# Patient Record
Sex: FEMALE | Race: WHITE | NOT HISPANIC OR LATINO | Employment: FULL TIME | URBAN - METROPOLITAN AREA
[De-identification: names, ages, dates, MRNs, and addresses within clinical notes are randomized per-mention and may not be internally consistent; named-entity substitution may affect disease eponyms.]

---

## 2018-07-13 ENCOUNTER — OFFICE VISIT (OUTPATIENT)
Dept: URGENT CARE | Facility: URGENT CARE | Age: 33
End: 2018-07-13
Payer: COMMERCIAL

## 2018-07-13 VITALS
SYSTOLIC BLOOD PRESSURE: 110 MMHG | DIASTOLIC BLOOD PRESSURE: 80 MMHG | RESPIRATION RATE: 16 BRPM | WEIGHT: 238.4 LBS | HEART RATE: 69 BPM | BODY MASS INDEX: 34.7 KG/M2 | TEMPERATURE: 98.2 F

## 2018-07-13 DIAGNOSIS — H11.32 SUBCONJUNCTIVAL HEMATOMA, LEFT: Primary | ICD-10-CM

## 2018-07-13 PROCEDURE — 99213 OFFICE O/P EST LOW 20 MIN: CPT | Performed by: FAMILY MEDICINE

## 2018-07-13 RX ORDER — CIPROFLOXACIN HYDROCHLORIDE 3.5 MG/ML
1 SOLUTION/ DROPS TOPICAL 3 TIMES DAILY
Qty: 1.1 ML | Refills: 0 | Status: SHIPPED | OUTPATIENT
Start: 2018-07-13 | End: 2018-07-20

## 2018-07-13 NOTE — PROGRESS NOTES
SUBJECTIVE:  Nasrin Vasquez is a 33 year old female  complains of sub-conjunctival hematoma, this happened last night when she awoke she was having some slight discomfort in her eye.    No fever no chills no visual problems no trauma to the eye    OBJECTIVE:  /80  Pulse 69  Temp 98.2  F (36.8  C) (Oral)  Resp 16  Wt 238 lb 6.4 oz (108.1 kg)  BMI 34.7 kg/m2  Exam; examination shows left lateral sub-conjunctival hematoma the iris appears normal no anterior chamber problems.    Remainder of exam is normal    ASSESSMENT:  1.  Some conjunctival hematoma; slight irritation so we will use an antibiotic drops on her gave her reassurance that this is not a significant problem not associated with blood pressure    PLAN:  1.  Ciloxan ophthalmic drops 1 3 times a day for 7 days return to your primary care at that time if still with irritation or significant redness in the eye

## 2018-07-13 NOTE — MR AVS SNAPSHOT
"              After Visit Summary   2018    Nasrin Vasquez    MRN: [id number]           Patient Information     Date Of Birth          1985        Visit Information        Provider Department      2018 9:20 AM Tenzin Daniel MD [facility]        Today's Diagnoses     Subconjunctival hematoma, left    -  1       Follow-ups after your visit        Who to contact     If you have questions or need follow up information about today's clinic visit or your schedule please contact [facility] directly at [phone].  Normal or non-critical lab and imaging results will be communicated to you by MyChart, letter or phone within 4 business days after the clinic has received the results. If you do not hear from us within 7 days, please contact the clinic through [employer] or phone. If you have a critical or abnormal lab result, we will notify you by phone as soon as possible.  Submit refill requests through [employer] or call your pharmacy and they will forward the refill request to us. Please allow 3 business days for your refill to be completed.          Additional Information About Your Visit        MyChart Information     [employer] lets you send messages to your doctor, view your test results, renew your prescriptions, schedule appointments and more. To sign up, go to [email or web address] . Click on \"Log in\" on the left side of the screen, which will take you to the Welcome page. Then click on \"Sign up Now\" on the right side of the page.     You will be asked to enter the access code listed below, as well as some personal information. Please follow the directions to create your username and password.     Your access code is: [email or web address]  Expires: 10/11/2018  9:46 AM     Your access code will  in 90 days. If you need help or a new code, please call your [place] clinic or [phone].        Care EveryWhere ID     This is your Care EveryWhere " ID. This could be used by other organizations to access your [facility] medical records  [id number]        Your Vitals Were     Pulse Temperature Respirations BMI (Body Mass Index)          69 98.2  F (36.8  C) (Oral) 16 34.7 kg/m2         Blood Pressure from Last 3 Encounters:   07/13/18 110/80   08/11/14 100/50   05/14/14 102/60    Weight from Last 3 Encounters:   07/13/18 238 lb 6.4 oz (108.1 kg)   08/11/14 192 lb 1.6 oz (87.1 kg)   05/14/14 204 lb (92.5 kg)              Today, you had the following     No orders found for display         Today's Medication Changes          These changes are accurate as of 7/13/18  9:46 AM.  If you have any questions, ask your nurse or doctor.               Start taking these medicines.        Dose/Directions    ciprofloxacin 0.3 % ophthalmic solution   Commonly known as:  CILOXAN   Used for:  Subconjunctival hematoma, left   Started by:  Tenzin Daniel MD        Dose:  1 drop   Place 1 drop Into the left eye 3 times daily for 7 days   Quantity:  1.1 mL   Refills:  0            Where to get your medicines      These medications were sent to [facility]/pharmacy #[id number] [place] [id number] [address]     Phone:  [phone]     ciprofloxacin 0.3 % ophthalmic solution                Primary Care Provider Fax #    Physician No Ref-Primary [phone]       No address on file        Equal Access to Services     JAMA GUTIERREZ AH: Hadii shade reido Sochelo, waaxda luqadaha, qaybta kaalmada leandra, danny hernandez. So [facility] [phone].    ATENCIÓN: Si habla español, tiene a mckeon disposición servicios gratuitos de asistencia lingüística. Llame al [phone].    We comply with applicable federal civil rights laws and [place] laws. We do not discriminate on the basis of race, color, national origin, age, disability, sex, sexual orientation, or gender identity.            Thank you!     Thank you for choosing  [facility]  for your care. Our goal is always to provide you with excellent care. Hearing back from our patients is one way we can continue to improve our services. Please take a few minutes to complete the written survey that you may receive in the mail after your visit with us. Thank you!             Your Updated Medication List - Protect others around you: Learn how to safely use, store and throw away your medicines at www.disposemymeds.org.          This list is accurate as of 7/13/18  9:46 AM.  Always use your most recent med list.                   Brand Name Dispense Instructions for use Diagnosis    amoxicillin-clavulanate 875-125 MG per tablet    AUGMENTIN    20 tablet    Take 1 tablet by mouth 2 times daily    Avulsed tooth, initial encounter, Acute pharyngitis, Ear pain, bilateral       ciprofloxacin 0.3 % ophthalmic solution    CILOXAN    1.1 mL    Place 1 drop Into the left eye 3 times daily for 7 days    Subconjunctival hematoma, left       ibuprofen 200 MG tablet    ADVIL/MOTRIN     Take 3 tablets by mouth as needed        mupirocin 2 % ointment    BACTROBAN    22 g    Apply to affected area bid-tid for 7 days    Angular cheilitis

## 2020-01-20 ENCOUNTER — OFFICE VISIT (OUTPATIENT)
Dept: URGENT CARE | Facility: URGENT CARE | Age: 35
End: 2020-01-20
Payer: COMMERCIAL

## 2020-01-20 VITALS
DIASTOLIC BLOOD PRESSURE: 61 MMHG | HEART RATE: 91 BPM | OXYGEN SATURATION: 98 % | TEMPERATURE: 97.2 F | SYSTOLIC BLOOD PRESSURE: 127 MMHG

## 2020-01-20 DIAGNOSIS — J18.9 PNEUMONIA OF RIGHT LOWER LOBE DUE TO INFECTIOUS ORGANISM: Primary | ICD-10-CM

## 2020-01-20 PROCEDURE — 99214 OFFICE O/P EST MOD 30 MIN: CPT | Performed by: FAMILY MEDICINE

## 2020-01-20 RX ORDER — BENZONATATE 100 MG/1
100 CAPSULE ORAL 3 TIMES DAILY PRN
Qty: 21 CAPSULE | Refills: 0 | Status: SHIPPED | OUTPATIENT
Start: 2020-01-20 | End: 2020-01-27

## 2020-01-20 RX ORDER — AZITHROMYCIN 250 MG/1
TABLET, FILM COATED ORAL
Qty: 6 TABLET | Refills: 0 | Status: SHIPPED | OUTPATIENT
Start: 2020-01-20 | End: 2020-01-25

## 2020-01-21 NOTE — PROGRESS NOTES
SUBJECTIVE: Nasrin Vasquez is a 34 year old female presenting with a chief complaint of fever, nasal congestion, cough  and hurts when breathing.  Onset of symptoms was 4 day(s) ago.  Course of illness is worsening.    Severity moderate  Current and Associated symptoms: stuffy nose and cough - non-productive  Treatment measures tried include Tylenol/Ibuprofen.  Predisposing factors include None.    No past medical history on file.  Allergies   Allergen Reactions     No Known Drug Allergy      Social History     Tobacco Use     Smoking status: Never Smoker     Smokeless tobacco: Never Used   Substance Use Topics     Alcohol use: No       ROS:  SKIN: no rash  GI: no vomiting    OBJECTIVE:  /61 (BP Location: Left arm, Cuff Size: Adult Regular)   Pulse 91   Temp 97.2  F (36.2  C) (Oral)   SpO2 98% GENERAL APPEARANCE: healthy, alert and no distress  EYES: EOMI,  PERRL, conjunctiva clear  HENT: ear canals and TM's normal.  Nose and mouth without ulcers, erythema or lesions  NECK: supple, nontender, no lymphadenopathy  RESP: rhonchi R lower posterior  CV: regular rates and rhythm, normal S1 S2, no murmur noted  SKIN: no suspicious lesions or rashes      ICD-10-CM    1. Pneumonia of right lower lobe due to infectious organism (H) J18.1 azithromycin (ZITHROMAX) 250 MG tablet     benzonatate (TESSALON) 100 MG capsule       Fluids/Rest, f/u if worse/not any better

## 2020-02-22 ENCOUNTER — OFFICE VISIT (OUTPATIENT)
Dept: URGENT CARE | Facility: URGENT CARE | Age: 35
End: 2020-02-22
Payer: COMMERCIAL

## 2020-02-22 ENCOUNTER — ANCILLARY PROCEDURE (OUTPATIENT)
Dept: GENERAL RADIOLOGY | Facility: CLINIC | Age: 35
End: 2020-02-22
Attending: FAMILY MEDICINE
Payer: COMMERCIAL

## 2020-02-22 VITALS
RESPIRATION RATE: 16 BRPM | HEART RATE: 52 BPM | WEIGHT: 233 LBS | TEMPERATURE: 97.9 F | SYSTOLIC BLOOD PRESSURE: 104 MMHG | DIASTOLIC BLOOD PRESSURE: 70 MMHG | BODY MASS INDEX: 33.91 KG/M2

## 2020-02-22 DIAGNOSIS — S99.912A ANKLE INJURY, LEFT, INITIAL ENCOUNTER: Primary | ICD-10-CM

## 2020-02-22 PROCEDURE — 73610 X-RAY EXAM OF ANKLE: CPT | Mod: LT

## 2020-02-22 PROCEDURE — 99214 OFFICE O/P EST MOD 30 MIN: CPT | Performed by: FAMILY MEDICINE

## 2020-02-22 NOTE — LETTER
[facility]  [address] [address]  [phone]      February 24, 2020    RE:  Nasrin Vasquez                                                                                                                                                       [address]   [facility] [address]            To whom it may concern:    Nasrin Vasquez is under my professional care for Medical.   Please excuse pt from work last Friaday and Saturday.  She may return to work after recheck with Podiatry.          Sincerely,        Jose Cruz Paul, DO    [facility]

## 2020-02-22 NOTE — PROGRESS NOTES
SUBJECTIVE:  Chief Complaint   Patient presents with     Ankle Pain     lt ankle pain,rolled ankle few days ago   .ident presents with a chief complaint of left ankle.  The injury occurred days ago.   The injury happened while while walking.   How: trauma:  immediate pain  The patient complained of moderate pain and has had decreased ROM.    Pain exacerbated by movement    He treated it initially with no therapy.   This is the first time this type of injury has occurred to this patient.     No past medical history on file.  Allergies   Allergen Reactions     No Known Drug Allergy      Social History     Tobacco Use     Smoking status: Never Smoker     Smokeless tobacco: Never Used   Substance Use Topics     Alcohol use: No       ROSINTEGUMENTARY/SKIN: NEGATIVE for open wound/bleeding and NEGATIVE for bruising  MUSCULOSKELETAL: NEGATIVE for joint swelling, paresthesias, radicular pain    EXAM: /70 (Cuff Size: Adult Large)   Pulse 52   Temp 97.9  F (36.6  C) (Oral)   Resp 16   Wt 105.7 kg (233 lb)   BMI 33.91 kg/m     Gen: healthy,alert,no distress  Extremity: ankle has pain with palpation and rom.   There is not compromise to the distal circulation.  Pulses are +2 and CRT is brisk.  EXTREMITIES: peripheral pulses normal  SKIN: no suspicious lesions or rashes  NEURO: Normal strength and tone, sensory exam grossly normal, mentation intact and speech normal    Xray without acute findings, no fx read by Jose Cruz Paul D.O.      ICD-10-CM    1. Ankle injury, left, initial encounter S99.912A XR Ankle Left G/E 3 Views     Nursing Communication 1     PODIATRY/FOOT & ANKLE SURGERY REFERRAL     SIL

## 2020-02-26 ENCOUNTER — OFFICE VISIT (OUTPATIENT)
Dept: PODIATRY | Facility: CLINIC | Age: 35
End: 2020-02-26
Payer: COMMERCIAL

## 2020-02-26 ENCOUNTER — TELEPHONE (OUTPATIENT)
Dept: PODIATRY | Facility: CLINIC | Age: 35
End: 2020-02-26

## 2020-02-26 VITALS
WEIGHT: 233 LBS | HEIGHT: 70 IN | BODY MASS INDEX: 33.36 KG/M2 | DIASTOLIC BLOOD PRESSURE: 70 MMHG | SYSTOLIC BLOOD PRESSURE: 116 MMHG

## 2020-02-26 DIAGNOSIS — M25.572 ACUTE LEFT ANKLE PAIN: Primary | ICD-10-CM

## 2020-02-26 DIAGNOSIS — S93.402A SPRAIN OF LEFT ANKLE, UNSPECIFIED LIGAMENT, INITIAL ENCOUNTER: ICD-10-CM

## 2020-02-26 PROCEDURE — 99203 OFFICE O/P NEW LOW 30 MIN: CPT | Performed by: PODIATRIST

## 2020-02-26 ASSESSMENT — MIFFLIN-ST. JEOR: SCORE: 1834.19

## 2020-02-26 NOTE — TELEPHONE ENCOUNTER
Reason for Call:  Form, our goal is to have forms completed with 7 days, however, some forms may require a visit or additional information.    Type of letter, form or note:  Attending Physician Statement    Where did the form come from: Patient or family brought in       Desired completion date of form: 02/27/20     How will form be returned?:  emailed to [email or web address]    Form was started and given to Dr Banks to complete.

## 2020-02-26 NOTE — PATIENT INSTRUCTIONS
Thank you for choosing [facility] Podiatry / Foot & Ankle Surgery!    DR. AARON'S CLINIC LOCATIONS     MONDAY - [place] WEDNESDAY (AM ONLY) - [place]   [address] [place] [address] [address] [address]   [phone] / [phone] / [phone]       THURSDAY - [place] SCHEDULE SURGERY: [phone]   [address] [facility] [address] APPOINTMENTS: [phone]   [place] [address] BILLING QUESTIONS: [phone] / [phone] RADIOLOGY: [phone]     Follow up in 1 month    [place] ORTHOTICS LOCATIONS  [facility]  [address]  [place] [address]  Phone: [phone]  Fax: [phone] [facility]  [address]  [place] [address]  Phone: [phone]   Fax: [phone]   [facility]  [address]  [place] [address]  Phone: [phone]  Fax: [phone] [facility]  [address]  [place] [address]  Phone: [phone]   Fax: [phone]   [facility]   [address]  [place] [address]  Phone: [phone]  Fax: [phone] * Please call any location listed to make an appointment for a casting/fitting. Your referral was sent to their central office and they will all have the order on file.

## 2020-02-27 NOTE — PROGRESS NOTES
ASSESSMENT/PLAN:    Encounter Diagnosis   Name Primary?     Acute left ankle pain Yes     I reviewed the x-ray images from urgent care with her.  The relevant anatomy of the ankle joint was discussed.    Her mechanism of injury was described as an inversion ankle injury of the left ankle.  However, clinical exam is not fully consistent with this type of injury.  She had more generalized pain with palpation around the ankle including the Achilles tendon.    A Tri-Lock brace was provided to help offload the ankle.    Because her work requires prolonged weightbearing, custom orthoses were recommended and prescribed.    I expect her pain to resolve.  I have asked her to return in 3 to 4 weeks.  If she continues to have discomfort I will consider advanced imaging and/or physical therapy.      Body mass index is 33.91 kg/m .    Weight management plan: Patient was referred to their PCP to discuss a diet and exercise plan.      Rajat Banks DPM, FACFAS, MS    [facility]/Foot & Ankle Surgery      ____________________________________________________________________    HPI:         Chief Complaint: Follow-up from urgent care for a left ankle injury and pain.  Onset of problem: 1 week ago she inverted her left ankle and is concerned that she is still having pain.  Pain/ discomfort is described as: Throbbing in foot but with rest.  Occasional pain in the lower calf.  Deep ache when walking.  Ratin out of 10 at worst  Frequency:  daily    The pain is made worse with prolonged walking  Previous treatment: ibuprofen, elevation, ice.  Urgent care 20. XR completed.     She describes left ankle pain medially, laterally and posterior.*  Patient Active Problem List   Diagnosis     CARDIOVASCULAR SCREENING; LDL GOAL LESS THAN 160   *  *No past surgical history on file.*  *  Current Outpatient Medications   Medication Sig Dispense Refill     ibuprofen (ADVIL,MOTRIN) 200 MG tablet Take 3 tablets by mouth as  needed         ROS:     A 10-point review of systems was performed and is positive for that noted above in the HPI and as seen below.  All other areas are negative.     Numbness in feet?  no   Calf pain with walking? yes  Recent foot/ankle injury? yno  Weight change over past 12 months? no  Self perception as overweight? yes  Recent flu-like symptoms? no  Joint pain other than feet ? knees    Social History: Employment:  [employer] at [employer] - on feet 8-12 hours per day at work;  Exercise/Physical activity:  no;  Tobacco use:  no  Social History     Socioeconomic History     Marital status: Single     Spouse name: Not on file     Number of children: Not on file     Years of education: Not on file     Highest education level: Not on file   Occupational History     Not on file   Social Needs     Financial resource strain: Not on file     Food insecurity:     Worry: Not on file     Inability: Not on file     Transportation needs:     Medical: Not on file     Non-medical: Not on file   Tobacco Use     Smoking status: Never Smoker     Smokeless tobacco: Never Used   Substance and Sexual Activity     Alcohol use: No     Drug use: No     Sexual activity: Never   Lifestyle     Physical activity:     Days per week: Not on file     Minutes per session: Not on file     Stress: Not on file   Relationships     Social connections:     Talks on phone: Not on file     Gets together: Not on file     Attends Gnosticist service: Not on file     Active member of club or organization: Not on file     Attends meetings of clubs or organizations: Not on file     Relationship status: Not on file     Intimate partner violence:     Fear of current or ex partner: Not on file     Emotionally abused: Not on file     Physically abused: Not on file     Forced sexual activity: Not on file   Other Topics Concern     Not on file   Social History Narrative     Not on file       Family history:  Family History   Problem Relation Age of Onset     Asthma Brother   "    Cancer Maternal Grandmother      Cancer Maternal Grandfather        Rheumatoid arthritis:  no  Foot Problems: no  Diabetes: no      EXAM:    Vitals: /70   Ht 1.765 m (5' 9.5\")   Wt 105.7 kg (233 lb)   BMI 33.91 kg/m    BMI: Body mass index is 33.91 kg/m .  Height: 5' 9.5\"    Constitutional/ general:  Pt is in no apparent distress, appears well-nourished.  Cooperative with history and physical exam.     Vascular:  Pedal pulses are palpable bilaterally for both the DP and PT arteries.  CFT < 3 sec.  No edema.  Pedal hair growth noted.     Neuro:  Alert and oriented x 3. Coordinated gait.  Light touch sensation is intact to the L4, L5, S1 distributions. No obvious deficits.  No evidence of neurological-based weakness, spasticity, or contracture in the lower extremities.     Derm: Normal texture and turgor.  No erythema, ecchymosis, or cyanosis.  No open lesions.     Musculoskeletal:    Lower extremity muscle strength is normal.  Patient is ambulatory without an assistive device or brace .  No gross deformities - rectus left  foot with weight bearing.  Smooth right ankle ROM. Pain with inversion and eversion of her left foot against resistance. Pain on palpation to the left Achilles tendon.     Radiographic Exam:  X-Ray Findings:  I personally reviewed the 2/22/20 films.      XR ANKLE LT G/E 3 VW 2/22/2020 10:19 AM      HISTORY: Ankle injury, left, initial encounter     COMPARISON: None.                                                                      IMPRESSION: No fractures are identified. The ankle mortise is intact.  The talar dome is unremarkable. Small plantar and Achilles calcaneal  spurs.      RACHELLE PHAM MD    "

## 2020-03-02 ENCOUNTER — TELEPHONE (OUTPATIENT)
Dept: PODIATRY | Facility: CLINIC | Age: 35
End: 2020-03-02

## 2020-03-02 NOTE — TELEPHONE ENCOUNTER
Called patient to ask about additional information regarding form. Patient answered and provided the answers below:    Did injury occur at work? If so, how? Yes, occurred while walking during work. Recalls rolling ankle several times and eventually becoming painful to walk.    Is patient currently working? Yes    Is patient asking for time off from work? No. States that brace has been helping alleviate foot pain. But after 6+hrs of work, feet are tired and painful.     Work restrictions possible? Yes, reducing hours or seated duty.       Patient also asked when she should follow up in the clinic and how long she should wear brace. Relayed to patient that she should follow up in 1 month and continue to wear brace up to 1 month.     Anaid Springer MA on 3/2/2020 at 5:04 PM

## 2020-03-06 NOTE — TELEPHONE ENCOUNTER
"I called Nasrin back.  She was not able to provide details regarding what \"details\" I need to provided for her employer. She said that her  told her there is a form that I need to fill out.  I asked her to please forward the form to me.  She will plan on delivering it to the [facility] on Monday and said she has the after visit summary with phone # and address.    I told her that I am happy to help with this.     Rajat Banks, HARINDER, FACFAS, MS    [facility] Podiatry/Foot & Ankle Surgery    "

## 2020-03-06 NOTE — TELEPHONE ENCOUNTER
Please also see telephone encounter dated 3/2/20.     Patient left voicemail stating she received a doctor's form for her job regarding working light duty with a brace. She works for the [employer] and they need a more specific note with restrictions. She would like a call back today if possible.     She can be reached at: [phone]  Ok to leave message : no consent on file    TBlanca Kelley RN

## 2020-03-12 NOTE — TELEPHONE ENCOUNTER
Called and LVM to call back with her date of injury and specific restriction end date for her paperwork.     Also need her to schedule a follow up in clinic in the next 2-3 weeks to write on the form.    Dr Banks will not be in clinic until Monday morning to finish the forms.    Given [facility] triage number to call back with dates.

## 2020-03-12 NOTE — TELEPHONE ENCOUNTER
[facility]   The original injury date was 2/20/20.   As far as restriction end date, she is not sure. The original form gave dates of 3/3-3/17/20. It wasn't detailed enough so not sure if is still following those dates or not.   As far as a follow up appointment, she states she will call back at another time to schedule.   No other information was left.     ROSANNA Kelley RN

## 2020-03-13 NOTE — TELEPHONE ENCOUNTER
Routed to Dr. Banks to advise.    David Irving, [facility]  Podiatry/Foot & Ankle Surgery  [facility]

## 2020-03-18 NOTE — TELEPHONE ENCOUNTER
Patient LVM that she got the forms. She asked about more specifications on what the 4 hr/ day meant. Her and her employer were unsure what that specifically meant. Would like a call to clarify.     Purnima Elizondo ATC

## 2020-03-18 NOTE — TELEPHONE ENCOUNTER
I saw her for ankle pain that she related to an injury at work.  I'm not sure where the original dates of restrictions came from, but was roughly a two week period after her clinic visit.  I can never fully understand the demands of one's job. We talked about different ways to take stress off the ankle. One was part time work for a period of time. The original form I believe said working 4 hours/ day for that two weeks. Therefore, that's what I put on the new form. However, I extended the date until early April to make sure she recovers.      I also requested she be allowed to wear the ankle brace at work.      If she is going better, perhaps no restrictions are needed other than wearing the brace as needed?  If her pain is not improving, then we had discussed physical therapy and MRI.     If more clarification is needed, and it's not the best time for her to come to clinic, we should set up a phone visit.     Dr. Banks

## 2020-03-18 NOTE — TELEPHONE ENCOUNTER
Forms completed Monday and emailed to patient this morning per her request.    Original placed in abstracting.

## 2020-03-19 NOTE — TELEPHONE ENCOUNTER
Letter sent to pts email as I have not been able to get ahold of her. Asked for her to schedule a telephone visit per Dr Banks.

## 2020-06-16 ENCOUNTER — OFFICE VISIT (OUTPATIENT)
Dept: URGENT CARE | Facility: URGENT CARE | Age: 35
End: 2020-06-16
Payer: COMMERCIAL

## 2020-06-16 VITALS
WEIGHT: 245.8 LBS | HEART RATE: 61 BPM | OXYGEN SATURATION: 100 % | BODY MASS INDEX: 35.78 KG/M2 | TEMPERATURE: 98.8 F | SYSTOLIC BLOOD PRESSURE: 132 MMHG | DIASTOLIC BLOOD PRESSURE: 76 MMHG

## 2020-06-16 DIAGNOSIS — R11.2 NAUSEA AND VOMITING, INTRACTABILITY OF VOMITING NOT SPECIFIED, UNSPECIFIED VOMITING TYPE: ICD-10-CM

## 2020-06-16 DIAGNOSIS — R19.7 DIARRHEA, UNSPECIFIED TYPE: ICD-10-CM

## 2020-06-16 DIAGNOSIS — R10.13 ABDOMINAL PAIN, EPIGASTRIC: Primary | ICD-10-CM

## 2020-06-16 LAB
ALBUMIN SERPL-MCNC: 3 G/DL (ref 3.4–5)
ALBUMIN UR-MCNC: NEGATIVE MG/DL
ALP SERPL-CCNC: 115 U/L (ref 40–150)
ALT SERPL W P-5'-P-CCNC: 19 U/L (ref 0–50)
AMYLASE SERPL-CCNC: 48 U/L (ref 30–110)
ANION GAP SERPL CALCULATED.3IONS-SCNC: <1 MMOL/L (ref 3–14)
APPEARANCE UR: CLEAR
AST SERPL W P-5'-P-CCNC: 12 U/L (ref 0–45)
BACTERIA #/AREA URNS HPF: ABNORMAL /HPF
BASOPHILS # BLD AUTO: 0 10E9/L (ref 0–0.2)
BASOPHILS NFR BLD AUTO: 0.3 %
BILIRUB SERPL-MCNC: 0.5 MG/DL (ref 0.2–1.3)
BILIRUB UR QL STRIP: NEGATIVE
BUN SERPL-MCNC: 10 MG/DL (ref 7–30)
CALCIUM SERPL-MCNC: 8.4 MG/DL (ref 8.5–10.1)
CHLORIDE SERPL-SCNC: 110 MMOL/L (ref 94–109)
CO2 SERPL-SCNC: 31 MMOL/L (ref 20–32)
COLOR UR AUTO: YELLOW
CREAT SERPL-MCNC: 0.84 MG/DL (ref 0.52–1.04)
DIFFERENTIAL METHOD BLD: NORMAL
EOSINOPHIL # BLD AUTO: 0.2 10E9/L (ref 0–0.7)
EOSINOPHIL NFR BLD AUTO: 2.4 %
ERYTHROCYTE [DISTWIDTH] IN BLOOD BY AUTOMATED COUNT: 12.3 % (ref 10–15)
GFR SERPL CREATININE-BSD FRML MDRD: >90 ML/MIN/{1.73_M2}
GLUCOSE SERPL-MCNC: 91 MG/DL (ref 70–99)
GLUCOSE UR STRIP-MCNC: NEGATIVE MG/DL
HCT VFR BLD AUTO: 43.7 % (ref 35–47)
HGB BLD-MCNC: 14.3 G/DL (ref 11.7–15.7)
HGB UR QL STRIP: ABNORMAL
KETONES UR STRIP-MCNC: NEGATIVE MG/DL
LEUKOCYTE ESTERASE UR QL STRIP: NEGATIVE
LYMPHOCYTES # BLD AUTO: 2.2 10E9/L (ref 0.8–5.3)
LYMPHOCYTES NFR BLD AUTO: 28.5 %
MCH RBC QN AUTO: 30.5 PG (ref 26.5–33)
MCHC RBC AUTO-ENTMCNC: 32.7 G/DL (ref 31.5–36.5)
MCV RBC AUTO: 93 FL (ref 78–100)
MONOCYTES # BLD AUTO: 0.4 10E9/L (ref 0–1.3)
MONOCYTES NFR BLD AUTO: 4.9 %
MUCOUS THREADS #/AREA URNS LPF: PRESENT /LPF
NEUTROPHILS # BLD AUTO: 4.8 10E9/L (ref 1.6–8.3)
NEUTROPHILS NFR BLD AUTO: 63.9 %
NITRATE UR QL: NEGATIVE
NON-SQ EPI CELLS #/AREA URNS LPF: ABNORMAL /LPF
PH UR STRIP: 5.5 PH (ref 5–7)
PLATELET # BLD AUTO: 286 10E9/L (ref 150–450)
POTASSIUM SERPL-SCNC: 4.7 MMOL/L (ref 3.4–5.3)
PROT SERPL-MCNC: 8 G/DL (ref 6.8–8.8)
RBC # BLD AUTO: 4.69 10E12/L (ref 3.8–5.2)
RBC #/AREA URNS AUTO: ABNORMAL /HPF
SODIUM SERPL-SCNC: 141 MMOL/L (ref 133–144)
SOURCE: ABNORMAL
SP GR UR STRIP: >1.03 (ref 1–1.03)
UROBILINOGEN UR STRIP-ACNC: 0.2 EU/DL (ref 0.2–1)
WBC # BLD AUTO: 7.5 10E9/L (ref 4–11)
WBC #/AREA URNS AUTO: ABNORMAL /HPF

## 2020-06-16 PROCEDURE — 85025 COMPLETE CBC W/AUTO DIFF WBC: CPT | Performed by: FAMILY MEDICINE

## 2020-06-16 PROCEDURE — 99214 OFFICE O/P EST MOD 30 MIN: CPT | Performed by: FAMILY MEDICINE

## 2020-06-16 PROCEDURE — 36415 COLL VENOUS BLD VENIPUNCTURE: CPT | Performed by: FAMILY MEDICINE

## 2020-06-16 PROCEDURE — 80053 COMPREHEN METABOLIC PANEL: CPT | Performed by: FAMILY MEDICINE

## 2020-06-16 PROCEDURE — 82150 ASSAY OF AMYLASE: CPT | Performed by: FAMILY MEDICINE

## 2020-06-16 PROCEDURE — 81001 URINALYSIS AUTO W/SCOPE: CPT | Performed by: FAMILY MEDICINE

## 2020-06-16 NOTE — PROGRESS NOTES
SUBJECTIVE  HPI: Nasrin Vasquez is a 35 year old female  who presents with the CC of abdominal/pelvic pain.   Pain is located in the generalized area, with radiation to None    The pain is characterized as cramping.    Pain has been present for day(s) and is stable.     EXACERBATING FACTORS: NEGATIVE.   RELIEVING FACTORS: NEGATIVE.    ASSOCIATED SX: nausea, vomiting and diarrhea.     No past medical history on file.  Allergies   Allergen Reactions     No Known Drug Allergy      Social History     Tobacco Use     Smoking status: Never Smoker     Smokeless tobacco: Never Used   Substance Use Topics     Alcohol use: No        Allergies   Allergen Reactions     No Known Drug Allergy         ROS:CONSTITUTIONAL:NEGATIVE for fever, chills, change in weight  INTEGUMENTARY/SKIN: NEGATIVE for worrisome rashes, moles or lesions    EXAMINATION:  /76   Pulse 61   Temp 98.8  F (37.1  C)   Wt 111.5 kg (245 lb 12.8 oz)   SpO2 100%   BMI 35.78 kg/m     GENERAL APPEARANCE: healthy, alert and no distress  ABDOMEN: tenderness mild generalized, hyperactive bowel sounds, no guarding/rigidity/rebound      ICD-10-CM    1. Abdominal pain, epigastric  R10.13 CBC with platelets differential     Comprehensive metabolic panel     Amylase     UA with Microscopic reflex to Culture   2. Diarrhea, unspecified type  R19.7 CBC with platelets differential     Comprehensive metabolic panel     Amylase     UA with Microscopic reflex to Culture   3. Nausea and vomiting, intractability of vomiting not specified, unspecified vomiting type  R11.2 CBC with platelets differential     Comprehensive metabolic panel     Amylase     UA with Microscopic reflex to Culture       F/U PCP/IM/FP, ED if worse

## 2020-09-08 ENCOUNTER — OFFICE VISIT (OUTPATIENT)
Dept: URGENT CARE | Facility: URGENT CARE | Age: 35
End: 2020-09-08
Payer: COMMERCIAL

## 2020-09-08 VITALS
TEMPERATURE: 98.1 F | DIASTOLIC BLOOD PRESSURE: 50 MMHG | HEART RATE: 52 BPM | WEIGHT: 249.4 LBS | SYSTOLIC BLOOD PRESSURE: 115 MMHG | OXYGEN SATURATION: 100 % | BODY MASS INDEX: 36.3 KG/M2

## 2020-09-08 DIAGNOSIS — H10.13 ALLERGIC CONJUNCTIVITIS, BILATERAL: ICD-10-CM

## 2020-09-08 DIAGNOSIS — H11.32 SUBCONJUNCTIVAL HEMORRHAGE OF LEFT EYE: Primary | ICD-10-CM

## 2020-09-08 PROCEDURE — 99213 OFFICE O/P EST LOW 20 MIN: CPT | Performed by: PHYSICIAN ASSISTANT

## 2020-09-08 NOTE — PATIENT INSTRUCTIONS
(H11.32) Subconjunctival hemorrhage of left eye  (primary encounter diagnosis)  Comment:   Plan: See handout on subconjunctival hemorrhages    Should you develop pain, seek further evaluation with OPHTHALMOLOGY    (H10.13) Allergic conjunctivitis, bilateral  Comment: with mild underlying seasonal allergies  Plan: ketotifen (ZADITOR) 0.025 % ophthalmic solution                Patient Education     Subconjunctival Hemorrhage    A subconjunctival hemorrhage is when a blood vessel breaks open in the white of the eye. It causes a bright red patch in the white of the eye. It is similar to a bruise on the skin. This type of hemorrhage is common. It can look quite alarming, but it is usually harmless.  Understanding the conjunctiva  The conjunctiva is the thin layer that covers the inside of the eyelids and the surface of the eye. It has many tiny blood vessels that bring oxygen and nutrients to the eye. The sclera is the white part of the eye that lies beneath the conjunctiva. Sometimes a blood vessel in the conjunctiva breaks open and bleeds. The blood then collects under the conjunctiva and turns part of the eye red. Over several weeks, your body then absorbs the blood.  What causes subconjunctival hemorrhage?  In many cases the cause isn t known. But some health conditions may make it more likely. These include:    Eye injury    Eye surgery    High blood pressure    Inflammation of the conjunctiva    Contact lens use    Diabetes    Arteriosclerosis    Tumor of the conjunctiva    Diseases that affect blood clotting    Violent sneezing, coughing, or vomiting    Certain medicines that can increase bleeding, such as aspirin    Pushing hard during childbirth    Straining during constipation  Symptoms of subconjunctival hemorrhage  The main symptom is a red patch on the eye. You may notice it after waking up in the morning. In most cases just one eye will have a hemorrhage. It usually happens once and then goes away. But some  health conditions may cause repeat hemorrhages. You may feel like you have something in your eye, but this is not common. The hemorrhage shouldn t affect your eyesight or cause any pain. If you do have pain, you may have another type of problem with your eye.  Diagnosing subconjunctival hemorrhage  Your healthcare provider will ask about your health history. You may have a physical exam. This includes a basic eye exam. Your provider will make sure you don t have other causes of red eye that may need other treatment.  You will need to see an eye doctor (ophthalmologist) if you have had an eye injury. This doctor might use a special lighted microscope to look closely at your eye. This helps show the doctor if the injury hurt the eye itself and not just its outer layer.  If this is not your first subconjunctival hemorrhage, your doctor may need to find the cause. For example, you may need blood tests to check for a blood clotting disorder.  Treatment for subconjunctival hemorrhage  In most cases you will not need treatment. The red patch will usually go away on its own in a few weeks. It will turn from red to brown and then yellow. There are no treatments to speed up this process. Your doctor may suggest you use a warm compress and artificial tears eye drops to help relieve some of the redness.  If your subconjunctival hemorrhage was caused by a health condition, that condition will be treated. For example, you may need a blood pressure medicine to treat high blood pressure.  When to call your healthcare provider  Call your healthcare provider right away if you have any of these:    Hemorrhage that doesn t go away in 2 to 3 weeks    Eye pain    Loss of eyesight    Another subconjunctival hemorrhage    Date Last Reviewed: 2/1/2017 2000-2019 [employer]. [address] [place] [address], [place] [address]. All rights reserved. This information is not intended as a substitute for professional medical care. Always  follow your healthcare professional's instructions.

## 2020-09-08 NOTE — PROGRESS NOTES
Patient presents with:  Urgent Care: pt.states she woke up this morning and her left eye was red, crusty, and painful    Has been lifting heavy items, moving  Does have allergies    SUBJECTIVE:  Chief Complaint:   Chief Complaint   Patient presents with     Urgent Care     pt.states she woke up this morning and her left eye was red, crusty, and painful     History of Present Illness:  Nasrin Vasquez is a 35 year old female who presents complaining of a red area in her left eye since this morning.  Denies any injury.  Has been doing heavy lifting as she is moving    Denies any vision changes.    Does have mild seasonal allergies and noticed some crusting of her eyes this morning.  Denies pain, but states that it is uncomfortable.     Contact wearer : No    No past medical history on file.  Current Outpatient Medications   Medication Sig Dispense Refill     ketotifen (ZADITOR) 0.025 % ophthalmic solution Place 1 drop into both eyes 2 times daily 1 Bottle 0     ibuprofen (ADVIL,MOTRIN) 200 MG tablet Take 3 tablets by mouth as needed          ROS:  CONSTITUTIONAL:NEGATIVE for fever, chills, change in weight  INTEGUMENTARY/SKIN: NEGATIVE for worrisome rashes, moles or lesions  EYES: as per HPI  ENT/MOUTH: NEGATIVE for ear, mouth and throat problems  RESP:NEGATIVE for significant cough or SOB  CV: NEGATIVE for chest pain, palpitations or peripheral edema  GI: NEGATIVE for nausea, abdominal pain, heartburn, or change in bowel habits  MUSCULOSKELETAL: NEGATIVE for significant arthralgias or myalgia  NEURO: NEGATIVE for weakness, dizziness or paresthesias  ENDOCRINE: NEGATIVE for temperature intolerance, skin/hair changes  Review of systems negative except as stated above.    OBJECTIVE:  /50   Pulse 52   Temp 98.1  F (36.7  C) (Oral)   Wt 113.1 kg (249 lb 6.4 oz)   SpO2 100%   BMI 36.30 kg/m       Visual acuity: 20/20 os, 20/20 od, 20/20 ou  General: no acute distress  Eye exam: right eye normal lid,  conjunctiva, cornea, pupil and fundus,   left eye normal lid, subconjunctival hemorrhage at lateral aspect, cornea, pupil and fundus.  No fluorescein uptake.  Palpebral cobblestoning.    NOSE: boggy turbinates   SKIN: no rashes or lesions    (H11.32) Subconjunctival hemorrhage of left eye  (primary encounter diagnosis)  Comment:   Plan: See handout on subconjunctival hemorrhages    Should you develop pain, seek further evaluation with OPHTHALMOLOGY    (H10.13) Allergic conjunctivitis, bilateral  Comment: with mild underlying seasonal allergies  Plan: ketotifen (ZADITOR) 0.025 % ophthalmic solution

## 2021-02-16 ENCOUNTER — OFFICE VISIT (OUTPATIENT)
Dept: URGENT CARE | Facility: URGENT CARE | Age: 36
End: 2021-02-16
Payer: COMMERCIAL

## 2021-02-16 ENCOUNTER — ANCILLARY PROCEDURE (OUTPATIENT)
Dept: GENERAL RADIOLOGY | Facility: CLINIC | Age: 36
End: 2021-02-16
Attending: FAMILY MEDICINE
Payer: COMMERCIAL

## 2021-02-16 VITALS
HEART RATE: 62 BPM | HEIGHT: 71 IN | WEIGHT: 240 LBS | OXYGEN SATURATION: 98 % | SYSTOLIC BLOOD PRESSURE: 122 MMHG | DIASTOLIC BLOOD PRESSURE: 58 MMHG | BODY MASS INDEX: 33.6 KG/M2 | RESPIRATION RATE: 20 BRPM | TEMPERATURE: 97.9 F

## 2021-02-16 DIAGNOSIS — M79.672 PAIN OF LEFT HEEL: Primary | ICD-10-CM

## 2021-02-16 PROCEDURE — 99214 OFFICE O/P EST MOD 30 MIN: CPT | Performed by: FAMILY MEDICINE

## 2021-02-16 PROCEDURE — 73650 X-RAY EXAM OF HEEL: CPT | Mod: LT | Performed by: RADIOLOGY

## 2021-02-16 RX ORDER — NAPROXEN 500 MG/1
500 TABLET ORAL 2 TIMES DAILY WITH MEALS
Qty: 14 TABLET | Refills: 0 | Status: SHIPPED | OUTPATIENT
Start: 2021-02-16 | End: 2021-02-23

## 2021-02-16 ASSESSMENT — MIFFLIN-ST. JEOR: SCORE: 1876.82

## 2021-02-16 NOTE — LETTER
[facility]  [address] [address]  [phone]      February 16, 2021    RE:  Nasrin Vasquez                                                                                                                                                       [address] [place] [id number]            To whom it may concern:    Nasrin Vasquez is under my professional care for Medical.    She  may return to work after recheck with Podiatry this week.          Sincerely,        Jose Cruz Paul DO    [facility]

## 2021-02-19 ENCOUNTER — OFFICE VISIT (OUTPATIENT)
Dept: PODIATRY | Facility: CLINIC | Age: 36
End: 2021-02-19
Payer: COMMERCIAL

## 2021-02-19 VITALS
WEIGHT: 240 LBS | DIASTOLIC BLOOD PRESSURE: 76 MMHG | BODY MASS INDEX: 34.36 KG/M2 | HEIGHT: 70 IN | SYSTOLIC BLOOD PRESSURE: 105 MMHG

## 2021-02-19 DIAGNOSIS — M72.2 PLANTAR FASCIITIS, LEFT: ICD-10-CM

## 2021-02-19 DIAGNOSIS — M79.672 LEFT FOOT PAIN: Primary | ICD-10-CM

## 2021-02-19 DIAGNOSIS — M76.62 TENDONITIS, ACHILLES, LEFT: ICD-10-CM

## 2021-02-19 DIAGNOSIS — M21.6X2 EQUINUS DEFORMITY OF LEFT FOOT: ICD-10-CM

## 2021-02-19 DIAGNOSIS — Q66.70 PES CAVUS: ICD-10-CM

## 2021-02-19 PROCEDURE — 99213 OFFICE O/P EST LOW 20 MIN: CPT | Performed by: PODIATRIST

## 2021-02-19 RX ORDER — MELOXICAM 15 MG/1
15 TABLET ORAL DAILY
Qty: 14 TABLET | Refills: 0 | Status: SHIPPED | OUTPATIENT
Start: 2021-02-19 | End: 2022-01-24

## 2021-02-19 ASSESSMENT — MIFFLIN-ST. JEOR: SCORE: 1868.88

## 2021-02-19 NOTE — PATIENT INSTRUCTIONS
Thank you for choosing [facility] Podiatry / Foot & Ankle Surgery!    DR. GO'S CLINIC:  [facility] [place] SCHEDULE SURGERY: [phone]   [address] BILLING QUESTIONS: [phone]   [place] [address] AFTER HOURS: [phone]   PH: [phone] CONSUMER PRICE LINE:[phone]   FAX: [phone] APPOINTMENTS: [phone]     Follow up: 4 weeks      Home Medical Equipment:  1. [facility]    [facility] -[id number]   [address]   [place] [phone]     2. [facility] - [address], [phone]    TENDONITIS   Tendons are the strong fibrous portions of muscles that attach to bones and allow the muscle to move a joint when it contracts. Tendons are very strong because they have a lot of force exerted on them. Sometimes tendons can become painful because they have suffered an acute injury, in which too much force was exerted at one time, or an overuse injury, in which a normal force was exerted too frequently or over a prolonged period of time. As a result, there is damage to the tendon and its surrounding soft tissue structures and they become inflammed. Because tendons do not have a great blood supply, they do not heal rapidly and the inflammation can become chronic.   Conservative treatment for tendinitis involves rest and anti-inflammatory measures. Ice is applied 15 minutes 2-3 times daily. Anti-inflammatory medications called NSAIDs (ibuprofen, example) can be taken provided they are used with caution, as they can lead to internal bleeding and increase the risk ofstroke and heart attack. Sometimes topical nitroglycerin is prescribed to help with pain. Often your doctor will use a special shoe or removable walking cast to immobilize the tendon, allowing it to heal without further damage from use. These devices are very useful in helping  tendons heal, but they may slow you down or make you feel like your hip, knee, or back are out ofalignment. This is temporary and should go away once you are out ofthe immobilization. You should not use a walking cast when showering or driving. Another option is Platelet Rich Plasma injections. (Normally done with a Sports and Orthorapedic doctor.   If conservative measures fail, your physician may need to surgically repair the tendon by removing any chronic inflammatory tissue and sewing it back together. Sometimes it is sewn to an adjacent tendon with similar function for support and sometimes it is lengthened. . Sometimes the bones around the tendon need to be realigned or reshaped to better support the tendon or prevent further damage. Your foot and ankle surgeon will discuss the specifics of your surgery with you, should you need it.      Towel stretch: Sit on a hard surface with your injured leg stretched out in front of you. Loop a towel around your toes and the ball of your foot and pull the towel toward your body keeping your leg straight. Hold this position for 15 to 30 seconds and then relax. Repeat 3 times. Then push the towel away with the ball of your foot. Repeat 3 times.  When you don't feel much of a stretch using the towel, you can start the standing calf stretch and the following exercises.    Standing calf stretch: Stand facing a wall with your hands on the wall at about eye level. Keep your injured leg back with your heel on the floor. Keep the other leg forward with the knee bent. Turn your back foot slightly inward (as if you were pigeon-toed). Slowly lean into the wall until you feel a stretch in the back of your calf. Hold the stretch for 15 to 30 seconds. Return to the starting position. Repeat 3 times. Do this exercise several times each day.     Standing soleus stretch: Stand facing a wall with your hands on the wall at about chest height. Keep your injured leg back with your heel on the  floor. Keep the other leg forward with the knee bent. Turn your back foot slightly inward (as if you were pigeon-toed). Bend your back knee slightly and gently lean into the wall until you feel a stretch in the lower calf of your injured leg. Hold the stretch for 15 to 30 seconds. Return to the starting position. Repeat 3 times.     Achilles stretch: Stand with the ball of one foot on a stair. Reach for the step below with your heel until you feel a stretch in the arch of your foot. Hold this position for 15 to 30 seconds and then relax. Repeat 3 times.     Heel raise: Balance yourself while standing behind a chair or counter. Using the chair or counter as a support to help you, raise your body up onto your toes and hold for 5 seconds. Then slowly lower yourself down without holding onto the support. (It's OK to keep holding onto the support if you need to.) When this exercise becomes less painful, try lowering yourself down on the injured leg only. Repeat 15 times. Do 2 sets of 15. Rest 30 seconds between sets.     Step-up: Stand with the foot of your injured leg on a support 3 to 5 inches high (like a small step or block of wood). Keep your other foot flat on the floor. Shift your weight onto the injured leg on the support. Straighten your injured leg as the other leg comes off the floor. Return to the starting position by bending your injured leg and slowly lowering your uninjured leg back to the floor. Do 2 sets of 15.     Resisted ankle eversion: Sit with both legs stretched out in front of you, with your feet about a shoulder's width apart. Tie a loop in one end of elastic tubing. Put the foot of your injured leg through the loop so that the tubing goes around the arch of that foot and wraps around the outside of the other foot. Hold onto the other end of the tubing with your hand to provide tension. Turn the foot of your injured leg up and out. Make sure you keep your other foot still so that it will allow  the tubing to stretch as you move the foot of your injured leg. Return to the starting position. Do 2 sets of 15.     Balance and reach exercises: Stand next to a chair with your injured leg farther from the chair. The chair will provide support if you need it. Stand on the foot of your injured leg and bend your knee slightly. Try to raise the arch of this foot while keeping your big toe on the floor. Keep your foot in this position. With the hand that is farther away from the chair, reach forward in front of you by bending at the waist. Avoid bending your knee any more as you do this. Repeat this 10 times. To make the exercise more challenging, reach farther in front of you. Do 2 sets of 10.  the same position as above. While keeping your arch height, reach the hand that is farther away from the chair across your body toward the chair. The farther you reach, the more challenging the exercise. Do 2 sets of 10.     Resisted ankle eversion: Sit with both legs stretched out in front of you, with your feet about a shoulder's width apart. Tie a loop in one end of elastic tubing. Put the foot of your injured leg through the loop so that the tubing goes around the arch of that foot and wraps around the outside of the other foot. Hold onto the other end of the tubing with your hand to provide tension. Turn the foot of your injured leg up and out. Make sure you keep your other foot still so that it will allow the tubing to stretch as you move the foot of your injured leg. Return to the starting position. Do 2 sets of 15.   If you have access to a wobble board, do the following exercises:  Wobble board exercises:     Stand on a wobble board with your feet shoulder width apart. Rock the board forwards and backwards 30 times, then side to side 30 times. Hold on to a chair if you need support.     Rotate the wobble board around so that the edge of the board is in contact with the floor at all times. Do this 30 times in a  clockwise and then a counterclockwise direction.     Balance on the wobble board for as long as you can without letting the edges touch the floor. Try to do this for 2 minutes without touching the floor.     Rotate the wobble board in clockwise and counterclockwise circles, but do not let the edge of the board touch the floor.     When you have mastered exercises A through D, try repeating them while standing on just your injured leg.     After you are able to do these exercises on one leg, try to do them with your eyes closed. Make sure you have something nearby to support you in case you lose your balance.  ACHILLES TENDON LENGTHENING  The Achilles tendon and calf muscles play a critical role in normal foot and ankle function. Tightness of the muscle and tendon complex prohibits the normal amount of ankle flexibility. Inadequate ankle dorsiflexion, also known as equinus, results in compensatory stress throughout the leg and foot.     Unnatural stress on the foot results in arch collapse, foot pronation, plantar fasciitis, pain in the ball of the foot, hammer toes, bunions, arthritis, foot wounds, etc. The knee, hip, and leg muscles may also be affected by ankle stiffness. People with diabetes have additional problems with tightness of the Achilles tendon. The combination of foot numbness and a tight Achilles may lead to pressure sores in the ball of the foot. Bone breakdown through the midfoot may also occur as a complication of equinus.     Initial treatment might involve stretching exercises. Stretching will keep the muscles loose but you will not likely accomplish making the tendon longer. Daily Achilles stretching is important   nonetheless. Surgical lengthening of the tendon is often required. .   Surgery can be performed in several ways. The tendon can be lengthened at the ankle level (Achilles lengthening) or in the mid calf (Gastroc lengthening). Achilles and gastrocnemius lengthening can be performed as  an isolated procedure or in combination with surgical procedures for other conditions.   Lengthening procedures are most commonly performed for treatment of flatfoot deformity, contracture associated with diabetes related forefoot and midfoot ulcers, contracture after injury or stroke and to treat Achilles tendonitis.   The goal of surgery is to make the tendon longer yet still preserve function of the calf muscles. Most surgical patients do not have noticeable weakness once they recover from the surgery.     The main potential hazard of surgery involves tendon rupture during the healing process. Rupture is not common but could potentially occur. The tendon can also become excessively lengthened, especially from the stress of premature walking before the tendon is healed. Tendon adhesion, skin adhesion and nerve irritation or numbness can also occur.    CALF AND ACHILLES TENDON STRECHES   Stretch gently, do not bounce.   Do each set of stretches three times a day while you are having symptoms.   Do each set of stretches once a day after symptoms are relieved.     1. Towel Stretch     Sit on hard surface with one leg straight out in front of you.     Loop a towel around the ball of the foot and pull the towel to your body.     Be sure to keep your leg straight.     You should feel tension in the calf muscle of the straight leg.     Repeat 3 times on each side for at least 15 seconds each.     2. Standing Calf Stretch     Stand about a foot from a wall and extend one leg behind you.     Keep both feet flat on the floor, toes pointed straight ahead, and the rear knee  straight.     Lean into the wall until you feel tension in the rear leg.     Hold for at least 15 seconds.     Repeat 2 times on each side.     3. Standing Achilles Tendon Stretch     Get into position of Standing Calf Stretch.     Lower the hips downward as you slightly bend the knee of the rear leg.     Keep both feet flat on the floor and toes straight  ahead.     Hold for at least 15 seconds.     Repeat 2 times on each side.   PLANTAR FASCIITIS  Plantar fasciitis is often referred to as heel spurs or heel pain. Plantar fasciitis is a very common problem that affects people of all foot shapes, age, weight and activity level. Pain may be in the arch or on the weight-bearing surface of the heel. The pain may come on without injury or identifiable cause. Pain is generally present when first getting out of bed in the morning or up from a seated break.     CAUSES  The plantar fascia is a dense fibrous band of tissue that stretches across the bottom surface of the foot. The fascia helps support the foot muscles and arch. Plantar fasciitis is thought to be caused by mechanical strain or overload. Frequent walking without shoes or wearing unsupportive shoes is thought to cause structural overload and ultimately inflammation of the plantar fascia. Some people have heel spurs that can be seen on x-ray. The heel spur is actually a minor component of plantar fascitis and is largely ignored.       SELF TREATMENT   The easiest solution is to stop walking around your home without shoes. Plantar fasciitis is largely a shoe problem. Shoes are either not being worn often enough or your current shoes are inadequate for your weight, foot structure or activity level. The majority of shoes on the market today are not sufficient to resist development of plantar fasciitis or to promote healing. Assume that your current shoes are inadequate and will need to be replaced. Even high quality shoes wear out with 6 months to one year of frequent use. Weight loss is another option. Losing ten pounds in the next two months may be enough to resolve the problem. Ice applied to the area of pain two to three times per day for ten minutes each session can be very helpful. Warm foot soaks in epsom salts can also relieve pain. This should continue until the problem resolves. Achilles tendon stretching is  essential. Stretch multiple times daily to promote healing and to prevent recurrence in the future. Over all stretching of the body is helpful as well such as the calves, thighs and lower back. Normally when one area of the body is tight, other areas are too. Gentle Yoga can be good for this.     Over the counter topical anti inflammatories can be helpful such as biofreeze, bengay, salon pas, ect...  Oral ibuprofen or aleve is recommended as well to try to calm down inflammation.     Night splints can be helpful to gradually stretch the foot at night as a lot of pain is when you get up in the morning. Taking a towel or thera band and stretching the foot back multiple times before you get ou of bed can be beneficial as well.     MEDICAL TREATMENT  Medical treatments often include custom arch supports, cortisone injections, physical therapy, splints to be worn in bed, prescription medications and surgery. The home treatments listed above will be necessary regardless of these advanced medical treatments. Surgery is rarely needed but is very helpful in selected cases.     PROGNOSIS  Plantar fasciitis can last from one day to a lifetime. Some people get intermittent fascitis that is very short-lived. Others suffer daily for years. Excessive body weight, frequent bare foot walking, long hours on the feet, inadequate shoes, predisposing foot structures and excessive activity such as running are all potential issues that lead to chronic and/or recurring plantar fascitis. Having plantar fasciitis means that you are forever prone to this problem and will require modification of some of the above factors. Most people seek treatment within one to four months. Healing usually requires a similar one to four month time frame. Healing time is relative to the amount of effort spent treating the problem.   Plantar fasciitis is highly recurrent. Risk factors often continue, including return to bare foot walking, inadequate shoes,  excessive body weight, excessive activities, etc. Your life style and foot structure may predispose you to recurrent plantar fasciitis. A daily prevention regimen can be very helpful. Ongoing use of shoe inserts, careful attention to appropriate shoes, daily Achilles stretching, etc. may prevent recurrence. Prompt attention at the earliest warning signs of heel pain can resolve the problem in as short as a few days.     EXERCISES  Stair Exercise: Step on the stairs with the ball of your foot and hold your position for at least 15 seconds, then slowly step down with the heels of your foot. You can do this daily and as often as you want.   Picking the Towel: Sit comfortably and then pick the towel up with your toes. You can use any object other than a towel as long as the material can be soft and you can pick it up with your toes.  Rolling the Bottle: Use a small ball or frozen water bottle and then roll it around with your foot.   Flex the Toes: Sit comfortably and then flex your toes by pointing it towards the floor or towards your body. This will relax and flex your foot and exercise your plantar fascia, the calf, and the Achilles tendon. The inability of the foot to stretch often causes the bunching up of the plantar fascia area leading to the pain.  Calf/Achilles Stretching: Lay on you back and raise one foot, then point your toes towards the floor. See photo below:               Hold each stretch for 10 seconds. Stretch 10 times per set, three sets per day. Morning, afternoon and evening. If your heel pain is very severe in the morning, consider doing the first set of stretches before you get out of bed.      OVER THE COUNTER INSERT RECOMMENDATIONS  SuperFeet   Sofsole Fit Spenco   Power Step   Walk-Fit Arch Cradles     Most of these can be found at your local [employer], [employer], or online.  **A good high quality over the counter insert should cost around $40-$50      [facility]  LOCATIONS  [place]  [address] [facility]  [phone]   [address]  [phone] [place]  [address] [facility]  [phone]   [place]  [address]  [phone]   [place]  [address]  [phone] [place] [address]  [phone]   [place]  [address]  [phone]   [place]  [address]  [phone] [place]  [address]  [phone]

## 2021-02-19 NOTE — LETTER
2/19/2021         RE: Nasrin Vasquez  [address]  [facility] [address]        Dear Colleague,    Thank you for referring your patient, Nasrin Vasquez, to the [facility]. Please see a copy of my visit note below.    PATIENT HISTORY:  Dr. Paul requested I see this patient for their foot issue.  Nasrin Vasquez is a 35 year old female who presents to clinic for left foot pain.  She notes that she is a  and on her feet all day for 9 to 10 hours.  She states that the started about 4 days ago.  She thought she felt a pull in her back ankle.  Denies [place] injury.  Will be a dull ache or occasional burning pain.  Usually it is worse when she gets up in the morning but she also had an injury to that ankle years ago where it is more stiff.  Currently pain is a 3 out of 10 but can be 8 out of 10 at its worst.  She is tried icing and staying off the foot and oral NSAIDs.  She notes that this is helped.  She did have an x-ray taken at urgent care which was negative for fractures.  She is wondering what else can be done for the heel pain.    Review of Systems:  Patient denies fever, chills, rash, wound, stiffness, limping, numbness, weakness, heart burn, blood in stool, chest pain with activity, calf pain when walking, shortness of breath with activity, chronic cough, easy bleeding/bruising, swelling of ankles, excessive thirst, fatigue, depression, anxiety.  Patient admits to limping at times.     PAST MEDICAL HISTORY: No past medical history on file.     PAST SURGICAL HISTORY: No past surgical history on file.     MEDICATIONS:   Current Outpatient Medications:      ibuprofen (ADVIL,MOTRIN) 200 MG tablet, Take 3 tablets by mouth as needed, Disp: , Rfl:      ketotifen (ZADITOR) 0.025 % ophthalmic solution, Place 1 drop into both eyes 2 times daily, Disp: 1 Bottle, Rfl: 0     naproxen (NAPROSYN) 500 MG tablet, Take 1 tablet (500 mg) by mouth 2 times  "daily (with meals) for 7 days, Disp: 14 tablet, Rfl: 0     ALLERGIES:    Allergies   Allergen Reactions     No Known Drug Allergy         SOCIAL HISTORY:   Social History     Socioeconomic History     Marital status: Single     Spouse name: Not on file     Number of children: Not on file     Years of education: Not on file     Highest education level: Not on file   Occupational History     Not on file   Social Needs     Financial resource strain: Not on file     Food insecurity     Worry: Not on file     Inability: Not on file     Transportation needs     Medical: Not on file     Non-medical: Not on file   Tobacco Use     Smoking status: Never Smoker     Smokeless tobacco: Never Used   Substance and Sexual Activity     Alcohol use: No     Drug use: No     Sexual activity: Never   Lifestyle     Physical activity     Days per week: Not on file     Minutes per session: Not on file     Stress: Not on file   Relationships     Social connections     Talks on phone: Not on file     Gets together: Not on file     Attends [employer] service: Not on file     Active member of club or organization: Not on file     Attends meetings of clubs or organizations: Not on file     Relationship status: Not on file     Intimate partner violence     Fear of current or ex partner: Not on file     Emotionally abused: Not on file     Physically abused: Not on file     Forced sexual activity: Not on file   Other Topics Concern     Not on file   Social History Narrative     Not on file        FAMILY HISTORY:   Family History   Problem Relation Age of Onset     Asthma Brother      Cancer Maternal Grandmother      Cancer Maternal Grandfather         EXAM:Vitals: /76   Ht 1.778 m (5' 10\")   Wt 108.9 kg (240 lb)   BMI 34.44 kg/m      General appearance: Patient is alert and fully cooperative with history & exam.  No sign of distress is noted during the visit.     Psychiatric: Affect is pleasant & appropriate.  Patient appears motivated to " improve health.     Respiratory: Breathing is regular & unlabored while sitting.     HEENT: Hearing is intact to spoken word.  Speech is clear.  No gross evidence of visual impairment that would impact ambulation.     Dermatologic: Skin is intact to both lower extremities without significant lesions, rash or abrasion.  No paronychia or evidence of soft tissue infection is noted.     Vascular: DP & PT pulses are intact & regular bilaterally.  No significant edema or varicosities noted.  CFT and skin temperature is normal to both lower extremities.     Neurologic: Lower extremity sensation is intact to light touch.  No evidence of weakness or contracture in the lower extremities.  No evidence of neuropathy.     Musculoskeletal: Patient is ambulatory without assistive device or brace.  Increased arch height.  Pain on palpation of the left Achilles tendon insertion and plantar heel.  She does have decreased dorsiflexion of the left ankle.    Radiographs:  Left foot xray - I personally reviewed the xrays - Small plantar calcaneal spur. Otherwise unremarkable calcaneus radiographs     ASSESSMENT:    Left foot pain  Pes cavus  Tendonitis, Achilles, left  Equinus deformity of left foot  Plantar fasciitis, left     Medical Decision Making/Plan:  Reviewed patient[facility] in [facility].  Reviewed and discussed x-rays with patient.  Discussed that her Achilles tendon is quite tight.  Talked about tendinitis.Reviewed and discussed causes of tendonitis.  We discussed treatments such as immobiliation, icing, stretching, heel lifts, orthotics, physical therapy, MRI.     She also has some minimal pain on the bottom of the heel which could indicate some plantar fasciitis as well. The potential causes and nature of plantar fasciitis were discussed with the patient.  We reviewed the natural history/prognosis of the condition and risks if left untreated.  These include chronic pain, other sites of pain due to gait changes, and potential  plantar fascial rupture.      We discussed possible causes of the condition as it relates to the patients specific situation.      Conservative treatment options were reviewed:  appropriate shoes, avoidance of barefoot walking, inserts/orthoses, stretching, ice, massage, immobilization and NSAIDs.     We also reviewed the options of injection therapy and surgery.  However, it was made clear that surgery is only considered when conservative therapy fails.  The risks and benefits of injection therapy, and surgery were discussed.     Since the pain is improving I do not feel she needs a tall Aircast boot and we will do an ankle brace with stretches and oral NSAIDs and inserts in the shoe.  Also talked about a night splint to try to help gradually stretch the Achilles tendon at night.  She will do this as well.  We will have her follow-up in 1 month.  If it has not improved or if the pain has gotten worse we will recommend a boot and an MRI of the ankle at that time.  All questions were answered to patient satisfaction and she will call further questions or concerns.      Patient risk factor: Patient is at medium risk for tendon tear.        Samreen Calderon DPM, Podiatry/Foot and Ankle Surgery        Again, thank you for allowing me to participate in the care of your patient.        Sincerely,        Samreen Calderon DPM, Podiatry/Foot and Ankle Surgery

## 2021-02-19 NOTE — PROGRESS NOTES
PATIENT HISTORY:  Dr. Paul requested I see this patient for their foot issue.  Nsarin Vasquez is a 35 year old female who presents to clinic for left foot pain.  She notes that she is a  and on her feet all day for 9 to 10 hours.  She states that the started about 4 days ago.  She thought she felt a pull in her back ankle.  Denies [place] injury.  Will be a dull ache or occasional burning pain.  Usually it is worse when she gets up in the morning but she also had an injury to that ankle years ago where it is more stiff.  Currently pain is a 3 out of 10 but can be 8 out of 10 at its worst.  She is tried icing and staying off the foot and oral NSAIDs.  She notes that this is helped.  She did have an x-ray taken at urgent care which was negative for fractures.  She is wondering what else can be done for the heel pain.    Review of Systems:  Patient denies fever, chills, rash, wound, stiffness, limping, numbness, weakness, heart burn, blood in stool, chest pain with activity, calf pain when walking, shortness of breath with activity, chronic cough, easy bleeding/bruising, swelling of ankles, excessive thirst, fatigue, depression, anxiety.  Patient admits to limping at times.     PAST MEDICAL HISTORY: No past medical history on file.     PAST SURGICAL HISTORY: No past surgical history on file.     MEDICATIONS:   Current Outpatient Medications:      ibuprofen (ADVIL,MOTRIN) 200 MG tablet, Take 3 tablets by mouth as needed, Disp: , Rfl:      ketotifen (ZADITOR) 0.025 % ophthalmic solution, Place 1 drop into both eyes 2 times daily, Disp: 1 Bottle, Rfl: 0     naproxen (NAPROSYN) 500 MG tablet, Take 1 tablet (500 mg) by mouth 2 times daily (with meals) for 7 days, Disp: 14 tablet, Rfl: 0     ALLERGIES:    Allergies   Allergen Reactions     No Known Drug Allergy         SOCIAL HISTORY:   Social History     Socioeconomic History     Marital status: Single     Spouse name: Not on file     Number of children:  "Not on file     Years of education: Not on file     Highest education level: Not on file   Occupational History     Not on file   Social Needs     Financial resource strain: Not on file     Food insecurity     Worry: Not on file     Inability: Not on file     Transportation needs     Medical: Not on file     Non-medical: Not on file   Tobacco Use     Smoking status: Never Smoker     Smokeless tobacco: Never Used   Substance and Sexual Activity     Alcohol use: No     Drug use: No     Sexual activity: Never   Lifestyle     Physical activity     Days per week: Not on file     Minutes per session: Not on file     Stress: Not on file   Relationships     Social connections     Talks on phone: Not on file     Gets together: Not on file     Attends [employer] service: Not on file     Active member of club or organization: Not on file     Attends meetings of clubs or organizations: Not on file     Relationship status: Not on file     Intimate partner violence     Fear of current or ex partner: Not on file     Emotionally abused: Not on file     Physically abused: Not on file     Forced sexual activity: Not on file   Other Topics Concern     Not on file   Social History Narrative     Not on file        FAMILY HISTORY:   Family History   Problem Relation Age of Onset     Asthma Brother      Cancer Maternal Grandmother      Cancer Maternal Grandfather         EXAM:Vitals: /76   Ht 1.778 m (5' 10\")   Wt 108.9 kg (240 lb)   BMI 34.44 kg/m      General appearance: Patient is alert and fully cooperative with history & exam.  No sign of distress is noted during the visit.     Psychiatric: Affect is pleasant & appropriate.  Patient appears motivated to improve health.     Respiratory: Breathing is regular & unlabored while sitting.     HEENT: Hearing is intact to spoken word.  Speech is clear.  No gross evidence of visual impairment that would impact ambulation.     Dermatologic: Skin is intact to both lower extremities " without significant lesions, rash or abrasion.  No paronychia or evidence of soft tissue infection is noted.     Vascular: DP & PT pulses are intact & regular bilaterally.  No significant edema or varicosities noted.  CFT and skin temperature is normal to both lower extremities.     Neurologic: Lower extremity sensation is intact to light touch.  No evidence of weakness or contracture in the lower extremities.  No evidence of neuropathy.     Musculoskeletal: Patient is ambulatory without assistive device or brace.  Increased arch height.  Pain on palpation of the left Achilles tendon insertion and plantar heel.  She does have decreased dorsiflexion of the left ankle.    Radiographs:  Left foot xray - I personally reviewed the xrays - Small plantar calcaneal spur. Otherwise unremarkable calcaneus radiographs     ASSESSMENT:    Left foot pain  Pes cavus  Tendonitis, Achilles, left  Equinus deformity of left foot  Plantar fasciitis, left     Medical Decision Making/Plan:  Reviewed patient's chart in [facility].  Reviewed and discussed x-rays with patient.  Discussed that her Achilles tendon is quite tight.  Talked about tendinitis.Reviewed and discussed causes of tendonitis.  We discussed treatments such as immobiliation, icing, stretching, heel lifts, orthotics, physical therapy, MRI.     She also has some minimal pain on the bottom of the heel which could indicate some plantar fasciitis as well. The potential causes and nature of plantar fasciitis were discussed with the patient.  We reviewed the natural history/prognosis of the condition and risks if left untreated.  These include chronic pain, other sites of pain due to gait changes, and potential plantar fascial rupture.      We discussed possible causes of the condition as it relates to the patients specific situation.      Conservative treatment options were reviewed:  appropriate shoes, avoidance of barefoot walking, inserts/orthoses, stretching, ice, massage,  immobilization and NSAIDs.     We also reviewed the options of injection therapy and surgery.  However, it was made clear that surgery is only considered when conservative therapy fails.  The risks and benefits of injection therapy, and surgery were discussed.     Since the pain is improving I do not feel she needs a tall Aircast boot and we will do an ankle brace with stretches and oral NSAIDs and inserts in the shoe.  Also talked about a night splint to try to help gradually stretch the Achilles tendon at night.  She will do this as well.  We will have her follow-up in 1 month.  If it has not improved or if the pain has gotten worse we will recommend a boot and an MRI of the ankle at that time.  All questions were answered to patient satisfaction and she will call further questions or concerns.      Patient risk factor: Patient is at medium risk for tendon tear.        Samreen Calderon DPM, Podiatry/Foot and Ankle Surgery

## 2021-04-06 NOTE — PROGRESS NOTES
"SUBJECTIVE:  Chief Complaint   Patient presents with     Urgent Care     Consult     left heel pain    .zuleyka who presents with a chief complaint of  left posterior heel pain.  Symptoms began month(s) ago , are moderate andstill present.  Context:Injury: no    Pain exacerbated by walking   Relieved bynothing She treated it initially with no therapy.   This is the first time this type of injury has occurred to this patient.     No past medical history on file.    No past surgical history on file.    Family History   Problem Relation Age of Onset     Asthma Brother      Cancer Maternal Grandmother      Cancer Maternal Grandfather        Social History     Tobacco Use     Smoking status: Never Smoker     Smokeless tobacco: Never Used   Substance Use Topics     Alcohol use: No       ROS:Review of systems negative except as stated below    EXAM: /58   Pulse 62   Temp 97.9  F (36.6  C)   Resp 20   Ht 1.791 m (5' 10.5\")   Wt 108.9 kg (240 lb)   SpO2 98%   BMI 33.95 kg/m     Exam:left heel  tenderness to palpation  GENERAL APPEARANCE: healthy, alert and no distress  EXTREMITIES: peripheral pulses normal  SKIN: no suspicious lesions or rashes  NEURO: Normal strength and tone, sensory exam grossly normal, mentation intact and speech normal    Xray without acute findings, no stress fx read by Jose Cruz Paul D.O.    ASSESSMENT:     ICD-10-CM    1. Pain of left heel  M79.672 XR Calcaneus Left G/E 2 Views     naproxen (NAPROSYN) 500 MG tablet     Orthopedic & Spine  Referral     Ice/rest  No stress fx by negative xr  " negative Alert & oriented; no sensory, motor or coordination deficits, normal reflexes

## 2021-12-03 ENCOUNTER — OFFICE VISIT (OUTPATIENT)
Dept: URGENT CARE | Facility: URGENT CARE | Age: 36
End: 2021-12-03
Payer: COMMERCIAL

## 2021-12-03 VITALS
WEIGHT: 240 LBS | RESPIRATION RATE: 20 BRPM | DIASTOLIC BLOOD PRESSURE: 72 MMHG | OXYGEN SATURATION: 98 % | HEART RATE: 63 BPM | BODY MASS INDEX: 34.44 KG/M2 | TEMPERATURE: 99.1 F | SYSTOLIC BLOOD PRESSURE: 116 MMHG

## 2021-12-03 DIAGNOSIS — J01.90 ACUTE SINUSITIS WITH COEXISTING CONDITION REQUIRING PROPHYLACTIC TREATMENT: Primary | ICD-10-CM

## 2021-12-03 DIAGNOSIS — Z20.822 SUSPECTED COVID-19 VIRUS INFECTION: ICD-10-CM

## 2021-12-03 LAB — SARS-COV-2 RNA RESP QL NAA+PROBE: NEGATIVE

## 2021-12-03 PROCEDURE — 99213 OFFICE O/P EST LOW 20 MIN: CPT | Performed by: FAMILY MEDICINE

## 2021-12-03 PROCEDURE — U0003 INFECTIOUS AGENT DETECTION BY NUCLEIC ACID (DNA OR RNA); SEVERE ACUTE RESPIRATORY SYNDROME CORONAVIRUS 2 (SARS-COV-2) (CORONAVIRUS DISEASE [COVID-19]), AMPLIFIED PROBE TECHNIQUE, MAKING USE OF HIGH THROUGHPUT TECHNOLOGIES AS DESCRIBED BY CMS-2020-01-R: HCPCS | Performed by: FAMILY MEDICINE

## 2021-12-03 PROCEDURE — U0005 INFEC AGEN DETEC AMPLI PROBE: HCPCS | Performed by: FAMILY MEDICINE

## 2021-12-03 RX ORDER — AMOXICILLIN 875 MG
875 TABLET ORAL 2 TIMES DAILY
Qty: 20 TABLET | Refills: 0 | Status: SHIPPED | OUTPATIENT
Start: 2021-12-03 | End: 2021-12-13

## 2021-12-03 NOTE — PROGRESS NOTES
SUBJECTIVE: Nasrin Vasquez is a 36 year old female presenting with a chief complaint of nasal congestion and facial pain/pressure.  Onset of symptoms was 5 day(s) ago.  Course of illness is worsening.    Severity moderate  Current and Associated symptoms: facial pain/pressure  Treatment measures tried include otc.      No past medical history on file.  Allergies   Allergen Reactions     No Known Drug Allergy      Social History     Tobacco Use     Smoking status: Never Smoker     Smokeless tobacco: Never Used   Substance Use Topics     Alcohol use: No       ROS:  SKIN: no rash  GI: no vomiting    OBJECTIVE:  /72   Pulse 63   Temp 99.1  F (37.3  C)   Resp 20   Wt 108.9 kg (240 lb)   SpO2 98%   BMI 34.44 kg/m  GENERAL APPEARANCE: healthy, alert and no distress  EYES: EOMI,  PERRL, conjunctiva clear  HENT: TM's normal bilaterally, rhinorrhea yellow and oral mucous membranes moist, no erythema noted  RESP: lungs clear to auscultation - no rales, rhonchi or wheezes  SKIN: no suspicious lesions or rashes      ICD-10-CM    1. Acute sinusitis with coexisting condition requiring prophylactic treatment  J01.90 amoxicillin (AMOXIL) 875 MG tablet   2. Suspected COVID-19 virus infection  Z20.822 Symptomatic COVID-19 Virus (Coronavirus) by PCR Nose       Fluids/Rest, f/u if worse/not any better

## 2021-12-03 NOTE — LETTER
[facility]  [address] [address]  [phone]      December 3, 2021    RE:  Nasrin Vasquez                                                                                                                                                       [address] [address]            To whom it may concern:    Nasrin Vasquez is under my professional care for Medical.  She  may return to work with the following: No working or lifting restrictions on or about until negative COVID test.          Sincerely,        DO AB [facility]

## 2022-01-06 ENCOUNTER — E-VISIT (OUTPATIENT)
Dept: URGENT CARE | Facility: CLINIC | Age: 37
End: 2022-01-06
Payer: COMMERCIAL

## 2022-01-06 ENCOUNTER — NURSE TRIAGE (OUTPATIENT)
Dept: NURSING | Facility: CLINIC | Age: 37
End: 2022-01-06
Payer: COMMERCIAL

## 2022-01-06 DIAGNOSIS — Z20.822 SUSPECTED COVID-19 VIRUS INFECTION: Primary | ICD-10-CM

## 2022-01-06 PROCEDURE — 99421 OL DIG E/M SVC 5-10 MIN: CPT | Performed by: PHYSICIAN ASSISTANT

## 2022-01-06 NOTE — PATIENT INSTRUCTIONS
Jasmine,      Based on your responses, you may have coronavirus (COVID-19). This illness can cause fever, cough and trouble breathing. Many people get a mild case and get better on their own. Some people can get very sick.    Will I be tested for COVID-19?  We would like to test you for COVID-19 virus. I have placed orders for this test.     To schedule: go to your [facility] home page and scroll down to the section that says  You have an appointment that needs to be scheduled  and click the large green button that says  Schedule Now  and follow the steps to find the next available openings.    If you are unable to complete these [facility] scheduling steps, please call [phone] to schedule your testing.     Return to work/school/ guidance:  Please let your workplace manager and staffing office know when your isolation ends.       If you receive a positive COVID-19 test result, follow the guidance of the those who are giving you the results. Usually the return to work is 10 days from symptom onset or positive test date, (or in some cases 20 days if you are immunocompromised). If your symptoms started after your positive test, the 10 days should start when your symptoms started.   o If you work at [employer], you must also be cleared by Employee Occupational Health and Safety to return to work.      If you receive a negative COVID-19 test result and did not have a high risk exposure to someone with a known positive COVID-19 test, you can return to work once you're free of fever for 24 hours without fever-reducing medication and your symptoms are improving or resolved.    If you receive a negative COVID-19 test and had a high-risk exposure to someone who has tested positive for COVID-19 then you can return to work 14 days after your last contact with the positive individual. Follow quarantine guidance given by your doctor or public health officials.     Sign up for GetWell Loop:  We know it's scary to hear  that you might have COVID-19. We want to track your symptoms to make sure you're okay over the next 2 weeks. Please look for an email from [employer]--this is a free, online program that we'll use to keep in touch. To sign up, follow the link in the email you will receive. Learn more at [email or web address]    How can I take care of myself?  Over the counter medications may help with your symptoms like congestion, cough, chills, or fever.    There are not many effective prescription treatments for early COVID-19. Hydroxychloroquine, ivermectin, and azithromycin are not effective or recommended for COVID-19.    If your symptoms started in the last 10 days, you may be able to receive a treatment with monoclonal antibodies. This treatment can lower your risk of severe illness and going to the hospital. It is given through an IV or under your skin (subcutaneous) and must be given at an infusion center. You must be 12 or older, weight at least 88 pounds, and have a positive COVID-19 test.     If you would like to sign up to be considered to receive the monoclonal antibody medicine, please complete a participation form through the [facility] here: MNRAP ([email or web address]/[email or web address]). You may also call the [facility] COVID-19 Public Hotline at 1-163.721.1399 (open Mon-Fri: 9am-7pm and Sat: 10am-6pm).     Not all people who are eligible will receive the medicine, since supply is limited. You will be contacted in the next 1 to 2 business days only if you are selected. If you do not receive a call, you have not been selected to receive the medicine. If you have any questions about this medication, please contact your primary care provider. For more information, see [email or web address]/diseases/[email or web address]/[email or web address]      Get lots of rest. Drink extra fluids (unless a doctor has told you not to)    Take Tylenol (acetaminophen) or ibuprofen for fever or  pain. If you have liver or kidney problems, ask your family doctor if it's okay to take Tylenol o ibuprofen    Take over the counter medications for your symptoms, as directed by your doctor. You may also talk to your pharmacist.      If you have other health problems (like cancer, heart failure, an organ transplant or severe kidney disease): Call your specialty clinic if you don't feel better in the next 2 days.    Know when to call 911. Emergency warning signs include:  o Trouble breathing or shortness of breath  o Pain or pressure in the chest that doesn't go away  o Feeling confused like you haven't felt before, or not being able to wake up  o Bluish-colored lips or face    Where can I get more information?    [facility] - About COVID-19: [email or web address]     CDC - What to Do If You're Sick:     www.cdc.gov/coronavirus/2019-ncov/about/steps-when-sick.html    CDC - Ending Home Isolation:  https://www.cdc.gov/coronavirus/2019-ncov/your-health/quarantine-isolation.html    CDC - Caring for Someone:  www.cdc.gov/coronavirus/2019-ncov/if-you-are-sick/care-for-someone.html    [facility] clinical trials (COVID-19 research studies): clinicalaffairs.[facility]-clinical-trials    Below are the COVID-19 hotlines at the [place] Department of Health ([facility]). Interpreters are available.  o For health questions: Call 815-823-4201 or 1-618.347.5786 (7 a.m. to 7 p.m.)  o For questions about schools and childcare: Call 200-805-0878 or 1-681.401.1027 (7 a.m. to 7 p.m.)  January 6, 2022  RE:  Nasrin Vasquez                                                                                                                  [address] [place] [id number]      To whom it may concern:    I evaluated Nasrin Vasquez on January 6, 2022. Nasrin Vasquez should be excused from work/school.     They should let their workplace manager and staffing office know when their quarantine  ends.    We can not give an exact date as it depends on the information below. They can calculate this on their own or work with their manager/staffing office to calculate this. (For example if they were exposed on 10/04, they would have to quarantine for 14 full days. That would be through 10/18. They could return on 10/19.)    Quarantine Guidelines:    If patient receives a positive COVID-19 test result, they should follow the guidance of those who are giving the results. Usually the return to work is 10 (or in some cases 20 days from symptom onset.) If they work at [employer], they must be cleared by Employee Occupational Health and Safety to return to work.      If patient receives a negative COVID-19 test result and did not have a high risk exposure to someone with a known positive COVID-19 test, they can return to work once they're free of fever for 24 hours without fever-reducing medication and their symptoms are improving or resolved.    If patient receives a negative COVID-19 test and if they had a high risk exposure to someone who has tested positive for COVID-19 then they can return to work 14 days after their last contact with the positive individual    Note: If there is ongoing exposure to the covid positive person, this quarantine period may be longer than 14 days. (For example, if they are continually exposed to their child, who tested positive and cannot isolate from them, then the quarantine of 7-14 days can't start until their child is no longer contagious. This is typically 10 days from onset to the child's symptoms. So the total duration may be 17-24 days in this case.)     Sincerely,  HEMA Hickey

## 2022-01-06 NOTE — TELEPHONE ENCOUNTER
Patient is calling and states that yesterday she started having  a headache runny nose and cough and shortness of breath.  Fever of 99.2.  Patient is currently at work.  Patient denies severe chest pain and denies severe shortness of breath.  Patient states that she will go to a urgent care today.    COVID 19 Nurse Triage Plan/Patient Instructions    Please be aware that novel coronavirus (COVID-19) may be circulating in the community. If you develop symptoms such as fever, cough, or SOB or if you have concerns about the presence of another infection including coronavirus (COVID-19), please contact your health care provider or visit [email or web address].     Disposition/Instructions    Virtual Visit with provider recommended. Reference Visit Selection Guide.    Thank you for taking steps to prevent the spread of this virus.  o Limit your contact with others.  o Wear a simple mask to cover your cough.  o Wash your hands well and often.    Resources    [facility]: About COVID-19: [email or web address]    CDC: What to Do If You're Sick: www.cdc.gov/coronavirus/2019-ncov/about/steps-when-sick.html    CDC: Ending Home Isolation: www.cdc.gov/coronavirus/2019-ncov/hcp/disposition-in-home-patients.html     CDC: Caring for Someone: www.cdc.gov/coronavirus/2019-ncov/if-you-are-sick/care-for-someone.html     [facility]: Interim Guidance for Hospital Discharge to Home: www.health.[facility].mn.us/diseases/coronavirus/hcp/hospdischarge.pdf    [facility] clinical trials (COVID-19 research studies): clinicalaffairs.[facility]/n-clinical-trials     Below are the COVID-19 hotlines at the [place] Department of Health ([facility]). Interpreters are available.   o For health questions: Call 633-226-4284 or 1-129.502.7305 (7 a.m. to 7 p.m.)  o For questions about schools and childcare: Call 244-186-7343 or 1-878.270.8663 (7 a.m. to 7 p.m.)                       Reason for Disposition    [1] COVID-19 infection  suspected by caller or triager AND [2] mild symptoms (cough, fever, or others) AND [3] negative COVID-19 rapid test    Additional Information    Negative: SEVERE difficulty breathing (e.g., struggling for each breath, speaks in single words)    Negative: Difficult to awaken or acting confused (e.g., disoriented, slurred speech)    Negative: Bluish (or gray) lips or face now    Negative: Shock suspected (e.g., cold/pale/clammy skin, too weak to stand, low BP, rapid pulse)    Negative: Sounds like a life-threatening emergency to the triager    Negative: SEVERE or constant chest pain or pressure (Exception: mild central chest pain, present only when coughing)    Negative: MODERATE difficulty breathing (e.g., speaks in phrases, SOB even at rest, pulse 100-120)    Negative: [1] Headache AND [2] stiff neck (can't touch chin to chest)    Negative: MILD difficulty breathing (e.g., minimal/no SOB at rest, SOB with walking, pulse <100)    Negative: Chest pain or pressure    Negative: Patient sounds very sick or weak to the triager    Negative: Fever > 103 F (39.4 C)    Negative: [1] Fever > 101 F (38.3 C) AND [2] age > 60 years    Negative: [1] Fever > 100.0 F (37.8 C) AND [2] bedridden (e.g., nursing home patient, CVA, chronic illness, recovering from surgery)    Negative: HIGH RISK for severe COVID complications (e.g., age > 64 years, obesity with BMI > 25, pregnant, chronic lung disease or other chronic medical condition)  (Exception: Already seen by PCP and no new or worsening symptoms.)    Negative: [1] HIGH RISK patient AND [2] influenza is widespread in the community AND [3] ONE OR MORE respiratory symptoms: cough, sore throat, runny or stuffy nose    Negative: [1] HIGH RISK patient AND [2] influenza exposure within the last 7 days AND [3] ONE OR MORE respiratory symptoms: cough, sore throat, runny or stuffy nose    Protocols used: CORONAVIRUS (COVID-19) DIAGNOSED OR [id number] 8.25.2021

## 2022-01-08 ENCOUNTER — HEALTH MAINTENANCE LETTER (OUTPATIENT)
Age: 37
End: 2022-01-08

## 2022-01-11 ENCOUNTER — LAB (OUTPATIENT)
Dept: URGENT CARE | Facility: URGENT CARE | Age: 37
End: 2022-01-11
Attending: PHYSICIAN ASSISTANT
Payer: COMMERCIAL

## 2022-01-11 DIAGNOSIS — Z20.822 SUSPECTED COVID-19 VIRUS INFECTION: ICD-10-CM

## 2022-01-11 LAB — SARS-COV-2 RNA RESP QL NAA+PROBE: NEGATIVE

## 2022-01-11 PROCEDURE — U0005 INFEC AGEN DETEC AMPLI PROBE: HCPCS

## 2022-01-11 PROCEDURE — U0003 INFECTIOUS AGENT DETECTION BY NUCLEIC ACID (DNA OR RNA); SEVERE ACUTE RESPIRATORY SYNDROME CORONAVIRUS 2 (SARS-COV-2) (CORONAVIRUS DISEASE [COVID-19]), AMPLIFIED PROBE TECHNIQUE, MAKING USE OF HIGH THROUGHPUT TECHNOLOGIES AS DESCRIBED BY CMS-2020-01-R: HCPCS

## 2022-01-23 ASSESSMENT — PATIENT HEALTH QUESTIONNAIRE - PHQ9
SUM OF ALL RESPONSES TO PHQ QUESTIONS 1-9: 6
10. IF YOU CHECKED OFF ANY PROBLEMS, HOW DIFFICULT HAVE THESE PROBLEMS MADE IT FOR YOU TO DO YOUR WORK, TAKE CARE OF THINGS AT HOME, OR GET ALONG WITH OTHER PEOPLE: SOMEWHAT DIFFICULT
SUM OF ALL RESPONSES TO PHQ QUESTIONS 1-9: 6

## 2022-01-23 ASSESSMENT — ENCOUNTER SYMPTOMS
JOINT SWELLING: 0
CHILLS: 0
HEMATOCHEZIA: 0
HEARTBURN: 0
DIZZINESS: 0
NERVOUS/ANXIOUS: 1
HEADACHES: 0
SHORTNESS OF BREATH: 0
EYE PAIN: 0
CONSTIPATION: 0
DYSURIA: 0
PARESTHESIAS: 0
WEAKNESS: 0
SORE THROAT: 0
PALPITATIONS: 0
FREQUENCY: 0
COUGH: 0
FEVER: 0
HEMATURIA: 0
ABDOMINAL PAIN: 0
DIARRHEA: 0
NAUSEA: 0
BREAST MASS: 0
ARTHRALGIAS: 1
MYALGIAS: 1

## 2022-01-24 ENCOUNTER — OFFICE VISIT (OUTPATIENT)
Dept: INTERNAL MEDICINE | Facility: CLINIC | Age: 37
End: 2022-01-24
Payer: COMMERCIAL

## 2022-01-24 VITALS
OXYGEN SATURATION: 97 % | WEIGHT: 222.6 LBS | HEIGHT: 70 IN | HEART RATE: 57 BPM | BODY MASS INDEX: 31.87 KG/M2 | DIASTOLIC BLOOD PRESSURE: 70 MMHG | TEMPERATURE: 98.8 F | SYSTOLIC BLOOD PRESSURE: 110 MMHG

## 2022-01-24 DIAGNOSIS — Z00.00 ROUTINE GENERAL MEDICAL EXAMINATION AT A HEALTH CARE FACILITY: Primary | ICD-10-CM

## 2022-01-24 DIAGNOSIS — F41.9 ANXIETY: ICD-10-CM

## 2022-01-24 DIAGNOSIS — Z23 ENCOUNTER FOR IMMUNIZATION: ICD-10-CM

## 2022-01-24 DIAGNOSIS — Z12.4 ENCOUNTER FOR PAP SMEAR OF CERVIX WITH HPV DNA COTESTING: ICD-10-CM

## 2022-01-24 DIAGNOSIS — M72.2 PLANTAR FASCIITIS, LEFT: ICD-10-CM

## 2022-01-24 DIAGNOSIS — Z13.220 ENCOUNTER FOR SCREENING FOR LIPID DISORDER: ICD-10-CM

## 2022-01-24 DIAGNOSIS — R42 DIZZINESS: ICD-10-CM

## 2022-01-24 LAB
ERYTHROCYTE [DISTWIDTH] IN BLOOD BY AUTOMATED COUNT: 11.8 % (ref 10–15)
HCT VFR BLD AUTO: 40.3 % (ref 35–47)
HGB BLD-MCNC: 13 G/DL (ref 11.7–15.7)
MCH RBC QN AUTO: 30 PG (ref 26.5–33)
MCHC RBC AUTO-ENTMCNC: 32.3 G/DL (ref 31.5–36.5)
MCV RBC AUTO: 93 FL (ref 78–100)
PLATELET # BLD AUTO: 263 10E3/UL (ref 150–450)
RBC # BLD AUTO: 4.34 10E6/UL (ref 3.8–5.2)
WBC # BLD AUTO: 7.7 10E3/UL (ref 4–11)

## 2022-01-24 PROCEDURE — 80061 LIPID PANEL: CPT | Performed by: INTERNAL MEDICINE

## 2022-01-24 PROCEDURE — 85027 COMPLETE CBC AUTOMATED: CPT | Performed by: INTERNAL MEDICINE

## 2022-01-24 PROCEDURE — 90471 IMMUNIZATION ADMIN: CPT | Performed by: INTERNAL MEDICINE

## 2022-01-24 PROCEDURE — 90715 TDAP VACCINE 7 YRS/> IM: CPT | Performed by: INTERNAL MEDICINE

## 2022-01-24 PROCEDURE — 87624 HPV HI-RISK TYP POOLED RSLT: CPT | Performed by: INTERNAL MEDICINE

## 2022-01-24 PROCEDURE — 99395 PREV VISIT EST AGE 18-39: CPT | Mod: 25 | Performed by: INTERNAL MEDICINE

## 2022-01-24 PROCEDURE — 36415 COLL VENOUS BLD VENIPUNCTURE: CPT | Performed by: INTERNAL MEDICINE

## 2022-01-24 PROCEDURE — G0145 SCR C/V CYTO,THINLAYER,RESCR: HCPCS | Performed by: INTERNAL MEDICINE

## 2022-01-24 PROCEDURE — 99214 OFFICE O/P EST MOD 30 MIN: CPT | Mod: 25 | Performed by: INTERNAL MEDICINE

## 2022-01-24 PROCEDURE — 80053 COMPREHEN METABOLIC PANEL: CPT | Performed by: INTERNAL MEDICINE

## 2022-01-24 PROCEDURE — 84443 ASSAY THYROID STIM HORMONE: CPT | Performed by: INTERNAL MEDICINE

## 2022-01-24 ASSESSMENT — ENCOUNTER SYMPTOMS
PALPITATIONS: 0
FREQUENCY: 0
SORE THROAT: 0
HEADACHES: 0
NERVOUS/ANXIOUS: 1
JOINT SWELLING: 0
HEARTBURN: 0
MYALGIAS: 1
ABDOMINAL PAIN: 0
DYSURIA: 0
NAUSEA: 0
HEMATURIA: 0
HEMATOCHEZIA: 0
CHILLS: 0
COUGH: 0
SHORTNESS OF BREATH: 0
BREAST MASS: 0
FEVER: 0
WEAKNESS: 0
PARESTHESIAS: 0
DIARRHEA: 0
DIZZINESS: 0
ARTHRALGIAS: 1
EYE PAIN: 0
CONSTIPATION: 0

## 2022-01-24 ASSESSMENT — PATIENT HEALTH QUESTIONNAIRE - PHQ9: SUM OF ALL RESPONSES TO PHQ QUESTIONS 1-9: 6

## 2022-01-24 ASSESSMENT — MIFFLIN-ST. JEOR: SCORE: 1788.93

## 2022-01-24 NOTE — PROGRESS NOTES
SUBJECTIVE:   CC: Nasrin Vasquez is an 36 year old woman who presents for preventive health visit.     Patient has been advised of split billing requirements and indicates understanding: Yes     Healthy Habits:     Getting at least 3 servings of Calcium per day:  NO    Bi-annual eye exam:  NO    Dental care twice a year:  NO    Sleep apnea or symptoms of sleep apnea:  None    Diet:  Regular (no restrictions)    Frequency of exercise:  2-3 days/week    Duration of exercise:  15-30 minutes    Taking medications regularly:  Yes    Medication side effects:  None    PHQ-2 Total Score: 3    Additional concerns today:  Yes    Jasmine presents today for a physical exam. This is the first time I have met Jasmine. We also discussed her left plantar fasciitis. She is a . Feels it is worse when she works longer than 8 hours or 5 shifts per week. Requesting note. Also dealing with increase in anxiety over the last 4-5 months. Not interested in medication but hoping to talk to someone about this. Sometimes she gets dizzy when her anxiety gets back. No syncope.    Today's PHQ-2 Score:   PHQ-2 ( 1999 Pfizer) 1/23/2022   Q1: Little interest or pleasure in doing things 1   Q2: Feeling down, depressed or hopeless 2   PHQ-2 Score 3   Q1: Little interest or pleasure in doing things Several days   Q2: Feeling down, depressed or hopeless More than half the days   PHQ-2 Score 3     Answers for HPI/ROS submitted by the patient on 1/23/2022  If you checked off any problems, how difficult have these problems made it for you to do your work, take care of things at home, or get along with other people?: Somewhat difficult  PHQ9 TOTAL SCORE: 6    Abuse: Current or Past (Physical, Sexual or Emotional) - No  Do you feel safe in your environment? Yes    Social History     Tobacco Use     Smoking status: Never Smoker     Smokeless tobacco: Never Used   Substance Use Topics     Alcohol use: Yes     Alcohol/week: 1.0 standard drink      Types: 1 Standard drinks or equivalent per week     If you drink alcohol do you typically have >3 drinks per day or >7 drinks per week? No    Alcohol Use 1/24/2022   Prescreen: >3 drinks/day or >7 drinks/week? -   Prescreen: >3 drinks/day or >7 drinks/week? No     Reviewed orders with patient.  Reviewed health maintenance and updated orders accordingly - Yes    Labs reviewed in EPIC    Breast Cancer Screening:  Breast CA Risk Assessment (FHS-7) 1/23/2022   Do you have a family history of breast, colon, or ovarian cancer? No / Unknown     Patient under 40 years of age: Routine Mammogram Screening not recommended.   Pertinent mammograms are reviewed under the imaging tab.    History of abnormal Pap smear: NO - age 30-65 PAP every 5 years with negative HPV co-testing recommended  PAP / HPV 5/14/2014   PAP (Historical) NIL     Reviewed and updated as needed this visit by clinical staff  Tobacco  Allergies  Meds  Problems  Med Hx  Surg Hx  Fam Hx       Reviewed and updated as needed this visit by Provider  Tobacco  Allergies  Meds  Problems  Med Hx  Surg Hx  Fam Hx       Review of Systems   Constitutional: Negative for chills and fever.   HENT: Negative for congestion, ear pain, hearing loss and sore throat.    Eyes: Negative for pain and visual disturbance.   Respiratory: Negative for cough and shortness of breath.    Cardiovascular: Negative for chest pain, palpitations and peripheral edema.   Gastrointestinal: Negative for abdominal pain, constipation, diarrhea, heartburn, hematochezia and nausea.   Breasts:  Negative for tenderness, breast mass and discharge.   Genitourinary: Negative for dysuria, frequency, genital sores, hematuria, pelvic pain, urgency, vaginal bleeding and vaginal discharge.   Musculoskeletal: Positive for arthralgias and myalgias. Negative for joint swelling.   Skin: Negative for rash.   Neurological: Negative for dizziness, weakness, headaches and paresthesias.  "  Psychiatric/Behavioral: Negative for mood changes. The patient is nervous/anxious.       OBJECTIVE:   /70   Pulse 57   Temp 98.8  F (37.1  C) (Tympanic)   Ht 1.784 m (5' 10.25\")   Wt 101 kg (222 lb 9.6 oz)   LMP 01/05/2022 (Within Days)   SpO2 97%   BMI 31.71 kg/m       Physical Exam  GENERAL: In no distress.  EYES: Conjunctivae/corneas clear. EOMs grossly intact. PERRL.  HENT: NC/AT, facies symmetric. Neck supple. No LAD or thyromegaly noted.  RESP: CTAB. No w/r/r.  CV: RRR, no m/r/g.  GI: NT, ND, without rebound or guarding, no CVA tenderness, no hepatomegaly appreciated.  : Vulva/vaginal tissue appears normal without lesion. Cervix fully visualized, no noted friability or lesions.  MSK: Moves all four extremities freely.  SKIN: No significant ulcers, lesions or rashes on the visualized portions of the skin  NEURO: Alert. Oriented.  PSYCH: Linear thought process. Speech normal rate and volume. No tangential thoughts, hallucinations, or delusions.    Diagnostic Test Results: Labs reviewed in Epic    ASSESSMENT/PLAN:   Routine general medical examination at a health care facility  Reviewed PMH. Discussed healthcare maintenance issues, including cancer screenings (Pap smear updated today), relevant immunizations, and cardiac risk factor screenings such as for cholesterol, HTN, and DM.    Plantar fasciitis, left  Work note given. Recommended good insoles. If further issues, podiatry referral placed.  - Orthopedic  Referral; Future    Dizziness  Unclear etiology. Appears to be somewhat associated with worsening anxiety. Will check basic labs as below.  - Comprehensive metabolic panel; Future  - CBC with platelets; Future  - TSH with free T4 reflex; Future    Anxiety  Not interested in medication but hoping to talk to someone. Therapy referral placed.  - Adult Mental Health  Referral; Future    Encounter for screening for lipid disorder  - Lipid panel reflex to direct LDL Non-fasting; " "Future    Encounter for Pap smear of cervix with HPV DNA cotesting  Pap obtained with broom. Plan pending results.  - Pap imaged thin layer screen with HPV - recommended age 30 - 65 years (select HPV order below)    Encounter for immunization  - TDAP VACCINE (Adacel, Boostrix)  [[id number]]    Patient has been advised of split billing requirements and indicates understanding: Yes    COUNSELING:  Special attention given to:        Regular exercise       Healthy diet/nutrition       Immunizations    Vaccinated for: TDAP      Estimated body mass index is 31.71 kg/m  as calculated from the following:    Height as of this encounter: 1.784 m (5' 10.25\").    Weight as of this encounter: 101 kg (222 lb 9.6 oz).    She reports that she has never smoked. She has never used smokeless tobacco.    Prabhu Joaquin MD  [facility]  "

## 2022-01-24 NOTE — LETTER
January 24, 2022      Nasrin Vasquez  [address]   [place] [address]        To Whom It May Concern:    Nasrin Vasquez was seen in our clinic. She continues to have pain in her left heel. She may continue to work limited to 8 hour workday for 5 days per week only.            Sincerely,        Prabhu Joaquin MD, MPH  [facility]  [facility]

## 2022-01-24 NOTE — PATIENT INSTRUCTIONS
- I will send you a message on [employer] when I am able to look at the results of your tests from today

## 2022-01-25 LAB
ALBUMIN SERPL-MCNC: 2.8 G/DL (ref 3.4–5)
ALP SERPL-CCNC: 74 U/L (ref 40–150)
ALT SERPL W P-5'-P-CCNC: 21 U/L (ref 0–50)
ANION GAP SERPL CALCULATED.3IONS-SCNC: 3 MMOL/L (ref 3–14)
AST SERPL W P-5'-P-CCNC: 10 U/L (ref 0–45)
BILIRUB SERPL-MCNC: 0.6 MG/DL (ref 0.2–1.3)
BUN SERPL-MCNC: 13 MG/DL (ref 7–30)
CALCIUM SERPL-MCNC: 8.7 MG/DL (ref 8.5–10.1)
CHLORIDE BLD-SCNC: 109 MMOL/L (ref 94–109)
CHOLEST SERPL-MCNC: 150 MG/DL
CO2 SERPL-SCNC: 27 MMOL/L (ref 20–32)
CREAT SERPL-MCNC: 0.8 MG/DL (ref 0.52–1.04)
FASTING STATUS PATIENT QL REPORTED: YES
GFR SERPL CREATININE-BSD FRML MDRD: >90 ML/MIN/1.73M2
GLUCOSE BLD-MCNC: 94 MG/DL (ref 70–99)
HDLC SERPL-MCNC: 65 MG/DL
LDLC SERPL CALC-MCNC: 77 MG/DL
NONHDLC SERPL-MCNC: 85 MG/DL
POTASSIUM BLD-SCNC: 3.6 MMOL/L (ref 3.4–5.3)
PROT SERPL-MCNC: 6.8 G/DL (ref 6.8–8.8)
SODIUM SERPL-SCNC: 139 MMOL/L (ref 133–144)
TRIGL SERPL-MCNC: 42 MG/DL
TSH SERPL DL<=0.005 MIU/L-ACNC: 2.21 MU/L (ref 0.4–4)

## 2022-01-26 LAB
BKR LAB AP GYN ADEQUACY: NORMAL
BKR LAB AP GYN INTERPRETATION: NORMAL
BKR LAB AP HPV REFLEX: NORMAL
BKR LAB AP PREVIOUS ABNORMAL: NORMAL
PATH REPORT.COMMENTS IMP SPEC: NORMAL
PATH REPORT.COMMENTS IMP SPEC: NORMAL
PATH REPORT.RELEVANT HX SPEC: NORMAL

## 2022-01-28 LAB
HUMAN PAPILLOMA VIRUS 16 DNA: NEGATIVE
HUMAN PAPILLOMA VIRUS 18 DNA: NEGATIVE
HUMAN PAPILLOMA VIRUS FINAL DIAGNOSIS: NORMAL
HUMAN PAPILLOMA VIRUS OTHER HR: NEGATIVE

## 2022-02-01 ENCOUNTER — OFFICE VISIT (OUTPATIENT)
Dept: PODIATRY | Facility: CLINIC | Age: 37
End: 2022-02-01
Attending: INTERNAL MEDICINE
Payer: COMMERCIAL

## 2022-02-01 VITALS
BODY MASS INDEX: 31.87 KG/M2 | HEIGHT: 70 IN | DIASTOLIC BLOOD PRESSURE: 70 MMHG | WEIGHT: 222.6 LBS | SYSTOLIC BLOOD PRESSURE: 118 MMHG

## 2022-02-01 DIAGNOSIS — M72.2 PLANTAR FASCIITIS, LEFT: Primary | ICD-10-CM

## 2022-02-01 PROCEDURE — 99213 OFFICE O/P EST LOW 20 MIN: CPT | Performed by: PODIATRIST

## 2022-02-01 ASSESSMENT — MIFFLIN-ST. JEOR: SCORE: 1788.93

## 2022-02-01 NOTE — LETTER
2/1/2022         RE: Nasrin Vasquez  [address]  [place] [address]        Dear Colleague,    Thank you for referring your patient, Nasrin Vasquez, to the [facility]. Please see a copy of my visit note below.    Assessment:      ICD-10-CM    1. Plantar fasciitis, left  M72.2 Orthopedic  Referral     Orthotics and Prosthetics DME        Plan:  Orders Placed This Encounter   Procedures     Orthotics and Prosthetics DME         Discussed the etiology and treatment of the condition with the patient.  Imaging studies reviewed and discussed with the patient.  Discussed surgical and conservative options.      -Injection- discussed  -NSAID- topical.  She gets stomach upset w/ prior ibu use  -Orthoses- SuperFeet, custom - Rx  -Activity- low impact,calf muscle stretching.  -PT- discussed Graston/ASTYM, eccentric training.  -Note for work    Return:  No follow-ups on file.    Cassie Ballesteros DPM                Chief Complaint:     Patient presents with:  Left Foot - Pain     left heel pain    HPI:  Nasrin Vasquez is a 36 year old year old female who presents for evaluation of heel pain.    Left heel and ball of foot pain x 2 weeks. Throbbing pain and can be 8/10. Worse with walking a lot. Tried advil, ice, and elevation along with a splint and inserts. Works as a  doing a walking route.    Has a night splint; it helps but makes her calf muscles spasm.  Used Ibuprofen but stopped because her stomach was upset.    Past Medical & Surgical History:  No past medical history on file.   No past surgical history on file.   Family History   Problem Relation Age of Onset     Asthma Brother      Cancer Maternal Grandmother      Cancer Maternal Grandfather         Social History:  ?  History   Smoking Status     Never Smoker   Smokeless Tobacco     Never Used     History   Drug Use No     Social History    Substance and Sexual Activity       "Alcohol use: Yes        Alcohol/week: 1.0 standard drink        Types: 1 Standard drinks or equivalent per week      Allergies:  ?   Allergies   Allergen Reactions     No Known Drug Allergy         Medications:    No current outpatient medications on file.     No current facility-administered medications for this visit.       Physical Exam:  ?  Vitals:  /70   Ht 1.784 m (5' 10.25\")   Wt 101 kg (222 lb 9.6 oz)   LMP 01/05/2022 (Within Days)   BMI 31.71 kg/m     General:  WD/WN, in NAD.  A&O x3.  Dermatologic:    Skin is intact, open lesions absent.   Skin texture, turgor is normal.  Vascular:  Pulses palpable bilateral.  Digital capillary refill time normal bilateral.  Skin temperature is normal to affected heel.  Generalized edema- none bilateral.  Focal edema- none heel left.  Neurologic:    Gross sensation normal.  Gait and balance normal.  Musculoskeletal:  Maximal pain to palpation of plantar heel left.  mild pain to palpation of posterior heel left.  Ankle dorsiflexion <10 degrees with the knee extended, >10 degrees with the knee flexed.  STJ, MTJ ROM intact to affected extremity.  Muscle strength 5/5  foot and ankle to affected extremity.     Stance:  Foot type:  Neutral to mild valgus          Again, thank you for allowing me to participate in the care of your patient.        Sincerely,        Cassie Ballesteros DPM    "

## 2022-02-01 NOTE — PROGRESS NOTES
Assessment:      ICD-10-CM    1. Plantar fasciitis, left  M72.2 Orthopedic  Referral     Orthotics and Prosthetics DME        Plan:  Orders Placed This Encounter   Procedures     Orthotics and Prosthetics DME         Discussed the etiology and treatment of the condition with the patient.  Imaging studies reviewed and discussed with the patient.  Discussed surgical and conservative options.      -Injection- discussed  -NSAID- topical.  She gets stomach upset w/ prior ibu use  -Orthoses- SuperFeet, custom - Rx  -Activity- low impact,calf muscle stretching.  -PT- discussed Graston/ASTYM, eccentric training.  -Note for work    Return:  No follow-ups on file.    Cassie Ballesteros DPM                Chief Complaint:     Patient presents with:  Left Foot - Pain     left heel pain    HPI:  Nasrin Vasquez is a 36 year old year old female who presents for evaluation of heel pain.    Left heel and ball of foot pain x 2 weeks. Throbbing pain and can be 8/10. Worse with walking a lot. Tried advil, ice, and elevation along with a splint and inserts. Works as a  doing a walking route.    Has a night splint; it helps but makes her calf muscles spasm.  Used Ibuprofen but stopped because her stomach was upset.    Past Medical & Surgical History:  No past medical history on file.   No past surgical history on file.   Family History   Problem Relation Age of Onset     Asthma Brother      Cancer Maternal Grandmother      Cancer Maternal Grandfather         Social History:  ?  History   Smoking Status     Never Smoker   Smokeless Tobacco     Never Used     History   Drug Use No     Social History    Substance and Sexual Activity      Alcohol use: Yes        Alcohol/week: 1.0 standard drink        Types: 1 Standard drinks or equivalent per week      Allergies:  ?   Allergies   Allergen Reactions     No Known Drug Allergy         Medications:    No current outpatient medications on file.     No current  "facility-administered medications for this visit.       Physical Exam:  ?  Vitals:  /70   Ht 1.784 m (5' 10.25\")   Wt 101 kg (222 lb 9.6 oz)   LMP 01/05/2022 (Within Days)   BMI 31.71 kg/m     General:  WD/WN, in NAD.  A&O x3.  Dermatologic:    Skin is intact, open lesions absent.   Skin texture, turgor is normal.  Vascular:  Pulses palpable bilateral.  Digital capillary refill time normal bilateral.  Skin temperature is normal to affected heel.  Generalized edema- none bilateral.  Focal edema- none heel left.  Neurologic:    Gross sensation normal.  Gait and balance normal.  Musculoskeletal:  Maximal pain to palpation of plantar heel left.  mild pain to palpation of posterior heel left.  Ankle dorsiflexion <10 degrees with the knee extended, >10 degrees with the knee flexed.  STJ, MTJ ROM intact to affected extremity.  Muscle strength 5/5  foot and ankle to affected extremity.     Stance:  Foot type:  Neutral to mild valgus      "

## 2022-02-01 NOTE — LETTER
February 1, 2022      RE: Nasrin Vasquez  [address]   [facility] [address]              To Whom It May Concern,      Nasrin Vasquez was seen and treated in clinic today for foot pain. Due to this pain, please allow her to limit activities such as standing and walking at work to pain tolerance for the next six weeks.      Sincerely,      Cassie Ballesteros DPM

## 2022-02-01 NOTE — PATIENT INSTRUCTIONS
PATIENT INSTRUCTIONS    Calf muscle stretching 2-3x daily as instructed, both with the knees straight and the knees bent. Hold for 30-60 seconds.  For personal instruction on this,  please request a Physical Therapy referral from your doctor.      [place] [facility] LOCATIONS  [facility]  [address]  [place] [address]  Phone: [phone]  Fax: [phone] [facility]  [address]  [place] [address]  Phone: [phone]   Fax: [phone]   [facility]  [address]  [place] [address]  Phone: [phone]  Fax: [phone] [facility]  [address]  [place] [address]  Phone: [phone]   Fax: [phone]   [facility]   [address]  [place] [address]  Phone: [phone]  Fax: [phone] * Please call any location listed to make an appointment for a casting/fitting. Your referral was sent to their central office and they will all have the order on file.         SuperFeet orthotics  SuperFeet are over the counter (OTC), you do not need a prescription.  Wear Superfeet orthotics in all of your shoes.  Remove the existing liner and replace it with SuperFeet.    SuperFeet are available on [employer], at [employer] and most specialty [employer] stores.      Diclofenac Gel- Apply to affected area only.  Apply 3-4 times daily for the first 3-4 days, then 1-2 times daily as needed.  Pharmacies, [facility], [employer].      Can perform cortisone injection in the heel if needed    Can refer to Physical Therapy if needed

## 2022-03-01 ENCOUNTER — OFFICE VISIT (OUTPATIENT)
Dept: URGENT CARE | Facility: URGENT CARE | Age: 37
End: 2022-03-01

## 2022-03-01 ENCOUNTER — ANCILLARY PROCEDURE (OUTPATIENT)
Dept: GENERAL RADIOLOGY | Facility: CLINIC | Age: 37
End: 2022-03-01
Attending: FAMILY MEDICINE
Payer: COMMERCIAL

## 2022-03-01 VITALS
DIASTOLIC BLOOD PRESSURE: 64 MMHG | TEMPERATURE: 98.1 F | BODY MASS INDEX: 30.56 KG/M2 | WEIGHT: 214.5 LBS | OXYGEN SATURATION: 98 % | SYSTOLIC BLOOD PRESSURE: 109 MMHG | HEART RATE: 54 BPM

## 2022-03-01 DIAGNOSIS — S79.911A HIP INJURY, RIGHT, INITIAL ENCOUNTER: Primary | ICD-10-CM

## 2022-03-01 DIAGNOSIS — R93.89 ABNORMAL X-RAY: ICD-10-CM

## 2022-03-01 LAB — RADIOLOGIST FLAGS: ABNORMAL

## 2022-03-01 PROCEDURE — 73502 X-RAY EXAM HIP UNI 2-3 VIEWS: CPT | Mod: RT | Performed by: RADIOLOGY

## 2022-03-01 PROCEDURE — 99214 OFFICE O/P EST MOD 30 MIN: CPT | Performed by: FAMILY MEDICINE

## 2022-03-01 NOTE — PROGRESS NOTES
SUBJECTIVE:  Chief Complaint   Patient presents with     Urgent Care     Pain in R hip that happened at work 3 wks ago. Startfing to cause pain in lower back and upper R thigh.    .ident presents with a chief complaint of right hip.  The injury occurred 3 weeks ago.   The injury happened while at work.   How: walking immediate pain  The patient complained of moderate pain and has had decreased ROM.    Pain exacerbated by weight-bearing and movement      No past medical history on file.  Allergies   Allergen Reactions     No Known Drug Allergy      Social History     Tobacco Use     Smoking status: Never Smoker     Smokeless tobacco: Never Used   Substance Use Topics     Alcohol use: Yes     Alcohol/week: 1.0 standard drink     Types: 1 Standard drinks or equivalent per week       ROSINTEGUMENTARY/SKIN: NEGATIVE for open wound/bleeding and NEGATIVE for bruising    EXAM:/64 (BP Location: Right arm, Patient Position: Sitting, Cuff Size: Adult Regular)   Pulse 54   Temp 98.1  F (36.7  C) (Oral)   Wt 97.3 kg (214 lb 8 oz)   SpO2 98%   BMI 30.56 kg/m   Gen: healthy,alert,no distress  Extremity: hip has pain with palpation  And rom/wt bearing.   There is not compromise to the distal circulation.  Pulses are +2 and CRT is brisk.GENERAL APPEARANCE: healthy, alert and no distress  SKIN: no suspicious lesions or rashes    X-RAY:  IMPRESSION: There is a nonspecific mixed lucent and sclerotic area  within the right acetabulum measuring approximately 3.3 x 1.7 cm.  Recommend dedicated right hip MRI with contrast for further  evaluation.     Right hip joint space is maintained. No acute fracture or dislocation.  Small subchondral cyst along the superior acetabular rim is probably  degenerative.      ICD-10-CM    1. Hip injury, right, initial encounter  S79.911A XR Hip Right 2-3 Views   2. Abnormal x-ray  R93.89      Pt will go through Orthopedic Urgent Care for cont w/u

## 2022-12-02 ENCOUNTER — ANCILLARY PROCEDURE (OUTPATIENT)
Dept: GENERAL RADIOLOGY | Facility: CLINIC | Age: 37
End: 2022-12-02
Attending: FAMILY MEDICINE
Payer: COMMERCIAL

## 2022-12-02 ENCOUNTER — OFFICE VISIT (OUTPATIENT)
Dept: URGENT CARE | Facility: URGENT CARE | Age: 37
End: 2022-12-02

## 2022-12-02 VITALS
DIASTOLIC BLOOD PRESSURE: 84 MMHG | OXYGEN SATURATION: 99 % | SYSTOLIC BLOOD PRESSURE: 132 MMHG | RESPIRATION RATE: 16 BRPM | HEART RATE: 83 BPM

## 2022-12-02 DIAGNOSIS — S60.011A CONTUSION OF RIGHT THUMB WITHOUT DAMAGE TO NAIL, INITIAL ENCOUNTER: ICD-10-CM

## 2022-12-02 DIAGNOSIS — S69.91XA INJURY OF RIGHT THUMB, INITIAL ENCOUNTER: Primary | ICD-10-CM

## 2022-12-02 PROCEDURE — 73140 X-RAY EXAM OF FINGER(S): CPT | Mod: TC | Performed by: RADIOLOGY

## 2022-12-02 PROCEDURE — 99214 OFFICE O/P EST MOD 30 MIN: CPT | Performed by: FAMILY MEDICINE

## 2022-12-02 RX ORDER — ESCITALOPRAM OXALATE 5 MG/1
TABLET ORAL
COMMUNITY
Start: 2022-11-29 | End: 2023-01-06

## 2022-12-02 NOTE — PROGRESS NOTES
SUBJECTIVE:     Chief Complaint   Patient presents with     Laceration     Cut R thumb at work on sliding door      Work bryan Alexandra was seen today for laceration.    Diagnoses and all orders for this visit:    Injury of right thumb, initial encounter  -     XR Finger Right G/E 2 Views    Contusion of right thumb without damage to nail, initial encounter        Assessment:  Injury of right thumb, initial encounter    PLAN:  PROCEDURE NOTE::    Wound cleaned with HIBICLENS  As no suturing needed area cleaned   After care instructions:  Keep wound clean and dry for the next 24-48 hours  Signs of infection discussed today  Apply anti-bacterial ointment for 4 days  May return to work as long as wound is kept clean and dry  As there is tenderness noted in the proximal phalanx would get a xray   Did not show any fracture discussed with patient to watch for any signs of infection      Nasrin Vasquez is a 37 year old female who presents to the clinic with a laceration on the right finger sustained 3 hour(s) ago.  This is a work related injury.    Mechanism of injury: hand got caught in the sliding door .    Associated symptoms: Denies numbness, weakness, or loss of function  Last tetanus booster within 10 years: yes    EXAM:   The patient appears today in alert,no apparent distress distress  VITALS: /84   Pulse 83   Resp 16   SpO2 99%     Size of laceration: 4 mm and 2 mmm lac noted in the proximal phalanx of the right thumb   Characteristics of the laceration: active bleeding and clean  Tendon function intact: yes  Sensation to light touch intact: no  Pulses intact: not applicable  Picture included in patient's chart: no

## 2022-12-11 ASSESSMENT — ANXIETY QUESTIONNAIRES
4. TROUBLE RELAXING: NEARLY EVERY DAY
7. FEELING AFRAID AS IF SOMETHING AWFUL MIGHT HAPPEN: NEARLY EVERY DAY
5. BEING SO RESTLESS THAT IT IS HARD TO SIT STILL: NOT AT ALL
GAD7 TOTAL SCORE: 18
6. BECOMING EASILY ANNOYED OR IRRITABLE: NEARLY EVERY DAY
GAD7 TOTAL SCORE: 18
1. FEELING NERVOUS, ANXIOUS, OR ON EDGE: NEARLY EVERY DAY
2. NOT BEING ABLE TO STOP OR CONTROL WORRYING: NEARLY EVERY DAY
3. WORRYING TOO MUCH ABOUT DIFFERENT THINGS: NEARLY EVERY DAY
GAD7 TOTAL SCORE: 18
IF YOU CHECKED OFF ANY PROBLEMS ON THIS QUESTIONNAIRE, HOW DIFFICULT HAVE THESE PROBLEMS MADE IT FOR YOU TO DO YOUR WORK, TAKE CARE OF THINGS AT HOME, OR GET ALONG WITH OTHER PEOPLE: EXTREMELY DIFFICULT
7. FEELING AFRAID AS IF SOMETHING AWFUL MIGHT HAPPEN: NEARLY EVERY DAY
8. IF YOU CHECKED OFF ANY PROBLEMS, HOW DIFFICULT HAVE THESE MADE IT FOR YOU TO DO YOUR WORK, TAKE CARE OF THINGS AT HOME, OR GET ALONG WITH OTHER PEOPLE?: EXTREMELY DIFFICULT

## 2022-12-11 ASSESSMENT — PATIENT HEALTH QUESTIONNAIRE - PHQ9
10. IF YOU CHECKED OFF ANY PROBLEMS, HOW DIFFICULT HAVE THESE PROBLEMS MADE IT FOR YOU TO DO YOUR WORK, TAKE CARE OF THINGS AT HOME, OR GET ALONG WITH OTHER PEOPLE: EXTREMELY DIFFICULT
SUM OF ALL RESPONSES TO PHQ QUESTIONS 1-9: 17
SUM OF ALL RESPONSES TO PHQ QUESTIONS 1-9: 17

## 2022-12-12 ENCOUNTER — OFFICE VISIT (OUTPATIENT)
Dept: FAMILY MEDICINE | Facility: CLINIC | Age: 37
End: 2022-12-12
Payer: COMMERCIAL

## 2022-12-12 VITALS
BODY MASS INDEX: 34.5 KG/M2 | OXYGEN SATURATION: 100 % | WEIGHT: 241 LBS | DIASTOLIC BLOOD PRESSURE: 70 MMHG | HEART RATE: 90 BPM | SYSTOLIC BLOOD PRESSURE: 118 MMHG | HEIGHT: 70 IN | TEMPERATURE: 97 F | RESPIRATION RATE: 16 BRPM

## 2022-12-12 DIAGNOSIS — R53.83 OTHER FATIGUE: Primary | ICD-10-CM

## 2022-12-12 DIAGNOSIS — G47.00 INSOMNIA, UNSPECIFIED TYPE: ICD-10-CM

## 2022-12-12 LAB
DEPRECATED CALCIDIOL+CALCIFEROL SERPL-MC: 21 UG/L (ref 20–75)
ERYTHROCYTE [DISTWIDTH] IN BLOOD BY AUTOMATED COUNT: 11.7 % (ref 10–15)
FASTING STATUS PATIENT QL REPORTED: YES
GLUCOSE SERPL-MCNC: 101 MG/DL (ref 70–99)
HBA1C MFR BLD: 5 % (ref 0–5.6)
HCT VFR BLD AUTO: 41.7 % (ref 35–47)
HGB BLD-MCNC: 14.1 G/DL (ref 11.7–15.7)
IRON BINDING CAPACITY (ROCHE): 307 UG/DL (ref 240–430)
IRON SATN MFR SERPL: 31 % (ref 15–46)
IRON SERPL-MCNC: 95 UG/DL (ref 37–145)
MCH RBC QN AUTO: 30.3 PG (ref 26.5–33)
MCHC RBC AUTO-ENTMCNC: 33.8 G/DL (ref 31.5–36.5)
MCV RBC AUTO: 90 FL (ref 78–100)
PLATELET # BLD AUTO: 254 10E3/UL (ref 150–450)
RBC # BLD AUTO: 4.65 10E6/UL (ref 3.8–5.2)
TSH SERPL DL<=0.005 MIU/L-ACNC: 3.08 UIU/ML (ref 0.3–4.2)
WBC # BLD AUTO: 4.8 10E3/UL (ref 4–11)

## 2022-12-12 PROCEDURE — 82306 VITAMIN D 25 HYDROXY: CPT | Performed by: NURSE PRACTITIONER

## 2022-12-12 PROCEDURE — 83550 IRON BINDING TEST: CPT | Performed by: NURSE PRACTITIONER

## 2022-12-12 PROCEDURE — 84443 ASSAY THYROID STIM HORMONE: CPT | Performed by: NURSE PRACTITIONER

## 2022-12-12 PROCEDURE — 83036 HEMOGLOBIN GLYCOSYLATED A1C: CPT | Performed by: NURSE PRACTITIONER

## 2022-12-12 PROCEDURE — 82947 ASSAY GLUCOSE BLOOD QUANT: CPT | Performed by: NURSE PRACTITIONER

## 2022-12-12 PROCEDURE — 83540 ASSAY OF IRON: CPT | Performed by: NURSE PRACTITIONER

## 2022-12-12 PROCEDURE — 36415 COLL VENOUS BLD VENIPUNCTURE: CPT | Performed by: NURSE PRACTITIONER

## 2022-12-12 PROCEDURE — 85027 COMPLETE CBC AUTOMATED: CPT | Performed by: NURSE PRACTITIONER

## 2022-12-12 PROCEDURE — 99214 OFFICE O/P EST MOD 30 MIN: CPT | Performed by: NURSE PRACTITIONER

## 2022-12-12 RX ORDER — HYDROXYZINE HYDROCHLORIDE 25 MG/1
25 TABLET, FILM COATED ORAL PRN
COMMUNITY
Start: 2022-12-07 | End: 2023-09-18

## 2022-12-12 RX ORDER — TRAZODONE HYDROCHLORIDE 50 MG/1
50 TABLET, FILM COATED ORAL AT BEDTIME
Qty: 30 TABLET | Refills: 3 | Status: SHIPPED | OUTPATIENT
Start: 2022-12-12 | End: 2023-09-18

## 2022-12-12 ASSESSMENT — ANXIETY QUESTIONNAIRES: GAD7 TOTAL SCORE: 18

## 2022-12-12 ASSESSMENT — PATIENT HEALTH QUESTIONNAIRE - PHQ9
10. IF YOU CHECKED OFF ANY PROBLEMS, HOW DIFFICULT HAVE THESE PROBLEMS MADE IT FOR YOU TO DO YOUR WORK, TAKE CARE OF THINGS AT HOME, OR GET ALONG WITH OTHER PEOPLE: EXTREMELY DIFFICULT
SUM OF ALL RESPONSES TO PHQ QUESTIONS 1-9: 17

## 2022-12-12 NOTE — LETTER
December 12, 2022      Nasrin Vasquez  [address]   [facility] [address]        To Whom It May Concern:    Nasrin Vasquez was seen in our clinic. Please excuse absence from work 12/12/22 and 12/13/22. Then may return without restrictions.    Sincerely,            Verona Ferrari, CNP

## 2022-12-12 NOTE — PROGRESS NOTES
"  Assessment & Plan     Other fatigue  Check labs, trial medication for sleep and see if improving.   - TSH with free T4 reflex  - Hemoglobin A1c  - Glucose  - Vitamin D Deficiency  - CBC with platelets  - Iron and iron binding capacity  - traZODone (DESYREL) 50 MG tablet  Dispense: 30 tablet; Refill: 3  - TSH with free T4 reflex  - Hemoglobin A1c  - Glucose  - Vitamin D Deficiency  - CBC with platelets  - Iron and iron binding capacity      Review of the result(s) of each unique test - lab  Ordering of each unique test  Prescription drug management       BMI:   Estimated body mass index is 34.33 kg/m  as calculated from the following:    Height as of this encounter: 1.784 m (5' 10.25\").    Weight as of this encounter: 109.3 kg (241 lb).       No follow-ups on file.    Verona Ferrari [facility] PRIOR AMADO Ferraro is a 37 year old, presenting for the following health issues:  Insomnia      History of Present Illness       Mental Health Follow-up:  Patient presents to follow-up on Depression & Anxiety.Patient's depression since last visit has been:  Worse  The patient is having other symptoms associated with depression.  Patient's anxiety since last visit has been:  Worse  The patient is having other symptoms associated with anxiety.  Any significant life events: job concerns  Patient is feeling anxious or having panic attacks.  Patient has no concerns about alcohol or drug use.    Reason for visit:  Insomnia  Symptom onset:  3-4 weeks ago  Symptoms include:  At first unable to stay asleep to barely sleeping  Symptom intensity:  Severe  Symptom progression:  Staying the same  Had these symptoms before:  Yes  Has tried/received treatment for these symptoms:  No  What makes it worse:  Stress of trying to maintain a daily routine, proof for work that it's a real struggle  What makes it better:  Not too much at the moment    She eats 0-1 servings of fruits and vegetables daily.She consumes 1 " "sweetened beverage(s) daily.She exercises with enough effort to increase her heart rate 30 to 60 minutes per day.  She exercises with enough effort to increase her heart rate 4 days per week.   She is taking medications regularly.    Today's PHQ-9         PHQ-9 Total Score: 17    PHQ-9 Q9 Thoughts of better off dead/self-harm past 2 weeks :   Not at all    How difficult have these problems made it for you to do your work, take care of things at home, or get along with other people: Extremely difficult  Today's WALT-7 Score: 18     Not sleeping well. Getting about 4 hours per day for about 1 month. Stress is bothersome and work is difficult and not understanding about sleep issues.        Review of Systems   Constitutional, HEENT, cardiovascular, pulmonary, GI, , musculoskeletal, neuro, skin, endocrine and psych systems are negative, except as otherwise noted.      Objective    /70 (BP Location: Left arm, Patient Position: Chair, Cuff Size: Adult Large)   Pulse 90   Temp 97  F (36.1  C) (Temporal)   Resp 16   Ht 1.784 m (5' 10.25\")   Wt 109.3 kg (241 lb)   SpO2 100%   BMI 34.33 kg/m    Body mass index is 34.33 kg/m .  Physical Exam   GENERAL: healthy, alert and no distress  NECK: no adenopathy, no asymmetry, masses, or scars and thyroid normal to palpation  RESP: lungs clear to auscultation - no rales, rhonchi or wheezes  CV: regular rate and rhythm, normal S1 S2, no S3 or S4, no murmur, click or rub, no peripheral edema and peripheral pulses strong  ABDOMEN: soft, nontender, no hepatosplenomegaly, no masses and bowel sounds normal  MS: no gross musculoskeletal defects noted, no edema              CLAUDETTE Russo     [address] [address]  [email or web address]   Office: [phone]                     "

## 2022-12-22 ENCOUNTER — MYC MEDICAL ADVICE (OUTPATIENT)
Dept: FAMILY MEDICINE | Facility: CLINIC | Age: 37
End: 2022-12-22

## 2022-12-22 NOTE — TELEPHONE ENCOUNTER
Please review mychart message     Mychart message sent to patient.     Bella BANGURA RN   [facility] Triage

## 2022-12-22 NOTE — LETTER
December 23, 2022      Nasrin Vasquez  [address]   [facility] [address]        To Whom It May Concern:    Nasrin Vasquez was seen in our clinic. She may return to work with the following: once symptoms have improved for 24 hours. Please excuse associated absences for the week of 12/19-12/23 related to dizziness.      Sincerely,            Verona Ferrari, CNP

## 2023-01-06 ENCOUNTER — OFFICE VISIT (OUTPATIENT)
Dept: INTERNAL MEDICINE | Facility: CLINIC | Age: 38
End: 2023-01-06
Payer: COMMERCIAL

## 2023-01-06 VITALS
HEART RATE: 54 BPM | WEIGHT: 247.3 LBS | OXYGEN SATURATION: 95 % | BODY MASS INDEX: 35.23 KG/M2 | DIASTOLIC BLOOD PRESSURE: 66 MMHG | SYSTOLIC BLOOD PRESSURE: 102 MMHG

## 2023-01-06 DIAGNOSIS — F33.2 MAJOR DEPRESSIVE DISORDER, RECURRENT EPISODE, SEVERE WITH ANXIOUS DISTRESS (H): Primary | ICD-10-CM

## 2023-01-06 PROCEDURE — 99214 OFFICE O/P EST MOD 30 MIN: CPT | Performed by: INTERNAL MEDICINE

## 2023-01-06 RX ORDER — BUSPIRONE HYDROCHLORIDE 5 MG/1
1 TABLET ORAL 2 TIMES DAILY
COMMUNITY
Start: 2022-12-29 | End: 2023-02-14

## 2023-01-06 ASSESSMENT — ANXIETY QUESTIONNAIRES
IF YOU CHECKED OFF ANY PROBLEMS ON THIS QUESTIONNAIRE, HOW DIFFICULT HAVE THESE PROBLEMS MADE IT FOR YOU TO DO YOUR WORK, TAKE CARE OF THINGS AT HOME, OR GET ALONG WITH OTHER PEOPLE: EXTREMELY DIFFICULT
1. FEELING NERVOUS, ANXIOUS, OR ON EDGE: NEARLY EVERY DAY
2. NOT BEING ABLE TO STOP OR CONTROL WORRYING: NEARLY EVERY DAY
4. TROUBLE RELAXING: NEARLY EVERY DAY
GAD7 TOTAL SCORE: 18
3. WORRYING TOO MUCH ABOUT DIFFERENT THINGS: NEARLY EVERY DAY
7. FEELING AFRAID AS IF SOMETHING AWFUL MIGHT HAPPEN: NEARLY EVERY DAY
6. BECOMING EASILY ANNOYED OR IRRITABLE: NEARLY EVERY DAY
GAD7 TOTAL SCORE: 18
GAD7 TOTAL SCORE: 18
5. BEING SO RESTLESS THAT IT IS HARD TO SIT STILL: NOT AT ALL
8. IF YOU CHECKED OFF ANY PROBLEMS, HOW DIFFICULT HAVE THESE MADE IT FOR YOU TO DO YOUR WORK, TAKE CARE OF THINGS AT HOME, OR GET ALONG WITH OTHER PEOPLE?: EXTREMELY DIFFICULT
7. FEELING AFRAID AS IF SOMETHING AWFUL MIGHT HAPPEN: NEARLY EVERY DAY

## 2023-01-06 ASSESSMENT — PATIENT HEALTH QUESTIONNAIRE - PHQ9
SUM OF ALL RESPONSES TO PHQ QUESTIONS 1-9: 24
10. IF YOU CHECKED OFF ANY PROBLEMS, HOW DIFFICULT HAVE THESE PROBLEMS MADE IT FOR YOU TO DO YOUR WORK, TAKE CARE OF THINGS AT HOME, OR GET ALONG WITH OTHER PEOPLE: EXTREMELY DIFFICULT
SUM OF ALL RESPONSES TO PHQ QUESTIONS 1-9: 24

## 2023-01-06 NOTE — PROGRESS NOTES
Assessment & Plan     Major depressive disorder, recurrent episode, severe with anxious distress (H)  I spent a long time empathizing with Jasmine today. She has notable life stressors that are leading to a worsening of her mental health, many of these life stressors related to her job as a . She does not feel like she is in a supportive environment and she feels her coworkers/bosses' attitudes are insensitive toward her health which leads to further worsening of her mental health. I discussed that it is not my place to decide whether a job or workplace environment is the right one for any individual but myself, but that it does sound like her work/workplace are hurting her mental health instead of helping it. I encouraged her to think about whether this job is the right one for her. She is currently under the care of a therapist (I encouraged her to continue therapy) and a psychiatrist (who recently switched her medications). She's been on Buspar for 4 days now. While I'm happy to drive her medications if asked to, I discussed my concern about having 'too many cooks in the kitchen' regarding medication management. She does plan to continue following with her psychiatrist and instead of a med change today I encouraged her to reach out to her psych team to discuss what's going on. I did provide a work note for Jasmine today in case that helps with her workplace/work. She denies active SI today, but toward the end of our visit today I discussed the role of the ER and/or our [facility] unit if her mental health continues to deteriorate.    F/u: Discussed regular f/u with her current mental health team and discussed the [facility] unit role/availability    Prabhu Joaquin MD  [facility]    Subjective   Jasmine is a 37 year old who presents for a same day acute care visit with chief concern of:  Dizziness, Depression, and Anxiety    History of Present Illness       Mental Health  Follow-up:  Patient presents to follow-up on Depression & Anxiety.Patient's depression since last visit has been:  Worse  The patient is having other symptoms associated with depression.  Patient's anxiety since last visit has been:  Bad  The patient is having other symptoms associated with anxiety.  Any significant life events: job concerns and health concerns  Patient is feeling anxious or having panic attacks.  Patient has no concerns about alcohol or drug use.    She eats 0-1 servings of fruits and vegetables daily.She consumes 1 sweetened beverage(s) daily.She exercises with enough effort to increase her heart rate 30 to 60 minutes per day.  She exercises with enough effort to increase her heart rate 6 days per week.   She is taking medications regularly.    Today's PHQ-9         PHQ-9 Total Score: 24    PHQ-9 Q9 Thoughts of better off dead/self-harm past 2 weeks :   Several days  Thoughts of suicide or self harm: (P) Yes  Self-harm Plan:   (P) Yes  Self-harm Action:     (P) No  Safety concerns for self or others: (P) Yes    How difficult have these problems made it for you to do your work, take care of things at home, or get along with other people: Extremely difficult  Today's WALT-7 Score: 18     Depression Screening Follow Up  PHQ 1/6/2023   PHQ-9 Total Score 24   Q9: Thoughts of better off dead/self-harm past 2 weeks Several days   F/U: Thoughts of suicide or self-harm Yes   F/U: Self harm-plan Yes   F/U: Self-harm action No   F/U: Safety concerns Yes     Last PHQ-9 1/6/2023   1.  Little interest or pleasure in doing things 3   2.  Feeling down, depressed, or hopeless 3   3.  Trouble falling or staying asleep, or sleeping too much 3   4.  Feeling tired or having little energy 3   5.  Poor appetite or overeating 3   6.  Feeling bad about yourself 3   7.  Trouble concentrating 3   8.  Moving slowly or restless 2   Q9: Thoughts of better off dead/self-harm past 2 weeks 1   PHQ-9 Total Score 24   In the past  two weeks have you had thoughts of suicide or self harm? Yes   Do you have concerns about your personal safety or the safety of others? Yes   In the past 2 weeks have you thought about a plan or had intention to harm yourself? Yes   In the past 2 weeks have you acted on these thoughts in any way? No     Feels depression is getting worse. She is in therapy but is not sure if it's helping. Recently had med changes by psych team. Work is really hard for her.    Review of Systems   Constitutional, psych systems are negative, except as otherwise noted.      Objective    /66   Pulse 54   Wt 112.2 kg (247 lb 4.8 oz)   LMP 12/13/2022 (Approximate)   SpO2 95%   BMI 35.23 kg/m    Body mass index is 35.23 kg/m .     Physical Exam   GENERAL: alert and in no distress.  EYES: conjunctivae/corneas clear. EOMs grossly intact  HENT: Facies symmetric.  RESP: No iWOB.  MSK: Moves all four extremities freely  SKIN: No significant ulcers, lesions, or rashes on the visualized portions of the skin  NEURO: CN II-XII grossly intact.

## 2023-01-06 NOTE — LETTER
January 6, 2023      Nasrin Vasquez  [address]   [facility] [address]        To Whom It May Concern:      Nasrin Vasquez was seen in our clinic. She has been diagnosed with uncontrolled major depression disorder and is under the care of a psychiatrist. This issue is currently symptomatic and the symptoms are limiting her ability to work more than 8-10 hours a day. Please take these health issues and recommended work restrictions into account as it relates to her health and work schedule.          Sincerely,        Prabhu Joaquin MD, MPH  [facility]  Internal Medicine

## 2023-02-14 ENCOUNTER — OFFICE VISIT (OUTPATIENT)
Dept: INTERNAL MEDICINE | Facility: CLINIC | Age: 38
End: 2023-02-14
Payer: COMMERCIAL

## 2023-02-14 VITALS
WEIGHT: 243.1 LBS | SYSTOLIC BLOOD PRESSURE: 106 MMHG | HEART RATE: 56 BPM | BODY MASS INDEX: 34.8 KG/M2 | TEMPERATURE: 98.1 F | HEIGHT: 70 IN | DIASTOLIC BLOOD PRESSURE: 70 MMHG

## 2023-02-14 DIAGNOSIS — Z00.00 ROUTINE GENERAL MEDICAL EXAMINATION AT A HEALTH CARE FACILITY: Primary | ICD-10-CM

## 2023-02-14 DIAGNOSIS — F33.2 MAJOR DEPRESSIVE DISORDER, RECURRENT EPISODE, SEVERE WITH ANXIOUS DISTRESS (H): ICD-10-CM

## 2023-02-14 DIAGNOSIS — Z23 ENCOUNTER FOR IMMUNIZATION: ICD-10-CM

## 2023-02-14 PROCEDURE — 99395 PREV VISIT EST AGE 18-39: CPT | Mod: 25 | Performed by: INTERNAL MEDICINE

## 2023-02-14 PROCEDURE — 90471 IMMUNIZATION ADMIN: CPT | Performed by: INTERNAL MEDICINE

## 2023-02-14 PROCEDURE — 91313 COVID-19 VACCINE BIVALENT BOOSTER 18+ (MODERNA): CPT | Performed by: INTERNAL MEDICINE

## 2023-02-14 PROCEDURE — 0134A COVID-19 VACCINE BIVALENT BOOSTER 18+ (MODERNA): CPT | Performed by: INTERNAL MEDICINE

## 2023-02-14 PROCEDURE — 90686 IIV4 VACC NO PRSV 0.5 ML IM: CPT | Performed by: INTERNAL MEDICINE

## 2023-02-14 RX ORDER — ESZOPICLONE 1 MG/1
1 TABLET, FILM COATED ORAL AT BEDTIME
COMMUNITY
Start: 2023-02-06 | End: 2023-09-18

## 2023-02-14 RX ORDER — BUSPIRONE HYDROCHLORIDE 7.5 MG/1
10 TABLET ORAL
COMMUNITY
Start: 2023-02-06 | End: 2023-03-20

## 2023-02-14 ASSESSMENT — ENCOUNTER SYMPTOMS
HEMATOCHEZIA: 0
CONSTIPATION: 0
NAUSEA: 0
JOINT SWELLING: 0
ARTHRALGIAS: 0
FREQUENCY: 0
EYE PAIN: 0
FEVER: 0
PARESTHESIAS: 0
SORE THROAT: 0
WEAKNESS: 0
HEMATURIA: 0
SHORTNESS OF BREATH: 0
COUGH: 0
HEARTBURN: 0
ABDOMINAL PAIN: 0
MYALGIAS: 0
HEADACHES: 0
DIARRHEA: 0
DIZZINESS: 0
PALPITATIONS: 0
DYSURIA: 0
BREAST MASS: 0
NERVOUS/ANXIOUS: 1
CHILLS: 0

## 2023-02-14 NOTE — PROGRESS NOTES
SUBJECTIVE:   CC: Jasmine is an 37 year old who presents for preventive health visit.   Patient has been advised of split billing requirements and indicates understanding: Yes  Healthy Habits:     Getting at least 3 servings of Calcium per day:  NO    Bi-annual eye exam:  NO    Dental care twice a year:  NO    Sleep apnea or symptoms of sleep apnea:  Daytime drowsiness    Diet:  Regular (no restrictions) and Breakfast skipped    Frequency of exercise:  1 day/week    Duration of exercise:  15-30 minutes    Taking medications regularly:  Yes    Medication side effects:  None    PHQ-2 Total Score: 0    Additional concerns today:  No    Jasmine presents today for a physical exam. We also discussed her mood which is much improved.    Today's PHQ-2 Score:   PHQ-2 ( 1999 Pfizer) 2/14/2023   Q1: Little interest or pleasure in doing things 0   Q2: Feeling down, depressed or hopeless 0   PHQ-2 Score 0   Q1: Little interest or pleasure in doing things Not at all   Q2: Feeling down, depressed or hopeless Not at all   PHQ-2 Score 0     Social History     Tobacco Use     Smoking status: Never     Smokeless tobacco: Never   Substance Use Topics     Alcohol use: Yes     Alcohol/week: 1.0 standard drink     Types: 1 Standard drinks or equivalent per week     If you drink alcohol do you typically have >3 drinks per day or >7 drinks per week? No    Alcohol Use 2/14/2023   Prescreen: >3 drinks/day or >7 drinks/week? No   Prescreen: >3 drinks/day or >7 drinks/week? -     Reviewed orders with patient.  Reviewed health maintenance and updated orders accordingly - Yes    Labs reviewed in EPIC    Breast Cancer Screening:  Breast CA Risk Assessment (FHS-7) 1/23/2022   Do you have a family history of breast, colon, or ovarian cancer? No / Unknown     Patient under 40 years of age: Routine Mammogram Screening not recommended.   Pertinent mammograms are reviewed under the imaging tab.    History of abnormal Pap smear: NO - age 30-65 PAP every 5  "years with negative HPV co-testing recommended  PAP / HPV Latest Ref Rng & Units 1/24/2022 5/14/2014   PAP   Negative for Intraepithelial Lesion or Malignancy (NILM) -   PAP (Historical) - - NIL   HPV16 Negative Negative -   HPV18 Negative Negative -   HRHPV Negative Negative -     Reviewed and updated as needed this visit by clinical staff   Tobacco  Allergies  Meds  Problems  Med Hx  Surg Hx  Fam Hx        Reviewed and updated as needed this visit by Provider   Tobacco  Allergies  Meds  Problems  Med Hx  Surg Hx  Fam Hx         Review of Systems   Constitutional: Negative for chills and fever.   HENT: Negative for congestion, ear pain, hearing loss and sore throat.    Eyes: Negative for pain and visual disturbance.   Respiratory: Negative for cough and shortness of breath.    Cardiovascular: Negative for chest pain, palpitations and peripheral edema.   Gastrointestinal: Negative for abdominal pain, constipation, diarrhea, heartburn, hematochezia and nausea.   Breasts:  Negative for tenderness, breast mass and discharge.   Genitourinary: Negative for dysuria, frequency, genital sores, hematuria, pelvic pain, urgency, vaginal bleeding and vaginal discharge.   Musculoskeletal: Negative for arthralgias, joint swelling and myalgias.   Skin: Negative for rash.   Neurological: Negative for dizziness, weakness, headaches and paresthesias.   Psychiatric/Behavioral: Positive for mood changes. The patient is nervous/anxious.      OBJECTIVE:   /70   Pulse 56   Temp 98.1  F (36.7  C)   Ht 1.772 m (5' 9.75\")   Wt 110.3 kg (243 lb 1.6 oz)   BMI 35.13 kg/m       Physical Exam  GENERAL: In no distress.  EYES: Conjunctivae/corneas clear. EOMs grossly intact.  HENT: NC/AT, facies symmetric. Neck supple. No LAD or thyromegaly noted.  RESP: CTAB. No w/r/r.  CV: RRR, no m/r/g.  GI: NT, ND, without rebound or guarding, no CVA tenderness  MSK: Moves all four extremities freely.  SKIN: No significant ulcers, " "lesions or rashes on the visualized portions of the skin  NEURO: Alert. Oriented.  PSYCH: Linear thought process. Speech normal rate and volume. No tangential thoughts, hallucinations, or delusions.    Diagnostic Test Results: Labs reviewed in Epic    ASSESSMENT/PLAN:   Routine general medical examination at a health care facility  Reviewed PMH. Discussed healthcare maintenance issues, including cancer screenings (UTD), relevant immunizations (updated today), and cardiac risk factor screenings such as for cholesterol, HTN, and DM.    Major depressive disorder, recurrent episode, severe with anxious distress (H)  Improved. She is continuing to follow with psychiatry. Defer cares/meds to them.    Encounter for immunization  - INFLUENZA VACCINE IM > 6 MONTHS VALENT IIV4 (AFLURIA/FLUZONE)  - COVID-19,PF,MODERNA BIVALENT 18+Yrs    COUNSELING:  Reviewed preventive health counseling, as reflected in patient instructions    BMI:   Estimated body mass index is 35.13 kg/m  as calculated from the following:    Height as of this encounter: 1.772 m (5' 9.75\").    Weight as of this encounter: 110.3 kg (243 lb 1.6 oz).     She reports that she has never smoked. She has never used smokeless tobacco.    Prabhu Joaquin MD  [facility]  "

## 2023-02-22 ENCOUNTER — OFFICE VISIT (OUTPATIENT)
Dept: URGENT CARE | Facility: URGENT CARE | Age: 38
End: 2023-02-22
Payer: COMMERCIAL

## 2023-02-22 VITALS
BODY MASS INDEX: 35.12 KG/M2 | OXYGEN SATURATION: 97 % | SYSTOLIC BLOOD PRESSURE: 141 MMHG | HEART RATE: 55 BPM | TEMPERATURE: 97.9 F | DIASTOLIC BLOOD PRESSURE: 85 MMHG | WEIGHT: 243 LBS

## 2023-02-22 DIAGNOSIS — R07.9 CHEST PAIN, UNSPECIFIED TYPE: Primary | ICD-10-CM

## 2023-02-22 PROCEDURE — 93000 ELECTROCARDIOGRAM COMPLETE: CPT | Performed by: FAMILY MEDICINE

## 2023-02-22 PROCEDURE — 99214 OFFICE O/P EST MOD 30 MIN: CPT | Performed by: FAMILY MEDICINE

## 2023-02-22 ASSESSMENT — ANXIETY QUESTIONNAIRES
6. BECOMING EASILY ANNOYED OR IRRITABLE: SEVERAL DAYS
GAD7 TOTAL SCORE: 10
1. FEELING NERVOUS, ANXIOUS, OR ON EDGE: MORE THAN HALF THE DAYS
5. BEING SO RESTLESS THAT IT IS HARD TO SIT STILL: SEVERAL DAYS
GAD7 TOTAL SCORE: 10
2. NOT BEING ABLE TO STOP OR CONTROL WORRYING: MORE THAN HALF THE DAYS
IF YOU CHECKED OFF ANY PROBLEMS ON THIS QUESTIONNAIRE, HOW DIFFICULT HAVE THESE PROBLEMS MADE IT FOR YOU TO DO YOUR WORK, TAKE CARE OF THINGS AT HOME, OR GET ALONG WITH OTHER PEOPLE: SOMEWHAT DIFFICULT
7. FEELING AFRAID AS IF SOMETHING AWFUL MIGHT HAPPEN: SEVERAL DAYS
3. WORRYING TOO MUCH ABOUT DIFFERENT THINGS: MORE THAN HALF THE DAYS

## 2023-02-22 ASSESSMENT — PATIENT HEALTH QUESTIONNAIRE - PHQ9: 5. POOR APPETITE OR OVEREATING: SEVERAL DAYS
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
## 2023-03-20 ENCOUNTER — OFFICE VISIT (OUTPATIENT)
Dept: URGENT CARE | Facility: URGENT CARE | Age: 38
End: 2023-03-20
Payer: COMMERCIAL

## 2023-03-20 ENCOUNTER — OFFICE VISIT (OUTPATIENT)
Dept: FAMILY MEDICINE | Facility: CLINIC | Age: 38
End: 2023-03-20
Payer: COMMERCIAL

## 2023-03-20 VITALS
HEART RATE: 66 BPM | BODY MASS INDEX: 35.36 KG/M2 | WEIGHT: 247 LBS | OXYGEN SATURATION: 97 % | DIASTOLIC BLOOD PRESSURE: 86 MMHG | RESPIRATION RATE: 16 BRPM | TEMPERATURE: 98.7 F | SYSTOLIC BLOOD PRESSURE: 138 MMHG | HEIGHT: 70 IN

## 2023-03-20 VITALS
BODY MASS INDEX: 36.42 KG/M2 | HEART RATE: 68 BPM | TEMPERATURE: 98.5 F | RESPIRATION RATE: 20 BRPM | WEIGHT: 252 LBS | DIASTOLIC BLOOD PRESSURE: 74 MMHG | OXYGEN SATURATION: 98 % | SYSTOLIC BLOOD PRESSURE: 178 MMHG

## 2023-03-20 DIAGNOSIS — F43.0 ACUTE REACTION TO STRESS: ICD-10-CM

## 2023-03-20 DIAGNOSIS — F33.2 MAJOR DEPRESSIVE DISORDER, RECURRENT EPISODE, SEVERE WITH ANXIOUS DISTRESS (H): ICD-10-CM

## 2023-03-20 DIAGNOSIS — F41.9 ANXIETY: ICD-10-CM

## 2023-03-20 DIAGNOSIS — F41.0 ANXIETY ATTACK: Primary | ICD-10-CM

## 2023-03-20 DIAGNOSIS — F32.A DEPRESSION, UNSPECIFIED DEPRESSION TYPE: Primary | ICD-10-CM

## 2023-03-20 PROCEDURE — 99213 OFFICE O/P EST LOW 20 MIN: CPT | Performed by: PHYSICIAN ASSISTANT

## 2023-03-20 PROCEDURE — 99214 OFFICE O/P EST MOD 30 MIN: CPT | Performed by: NURSE PRACTITIONER

## 2023-03-20 PROCEDURE — 96127 BRIEF EMOTIONAL/BEHAV ASSMT: CPT | Performed by: NURSE PRACTITIONER

## 2023-03-20 RX ORDER — BUSPIRONE HYDROCHLORIDE 10 MG/1
10 TABLET ORAL
COMMUNITY
Start: 2023-03-20 | End: 2023-09-18

## 2023-03-20 RX ORDER — PROPRANOLOL HYDROCHLORIDE 10 MG/1
TABLET ORAL
Qty: 30 TABLET | Refills: 0 | Status: SHIPPED | OUTPATIENT
Start: 2023-03-20

## 2023-03-20 ASSESSMENT — ANXIETY QUESTIONNAIRES
GAD7 TOTAL SCORE: 13
6. BECOMING EASILY ANNOYED OR IRRITABLE: SEVERAL DAYS
1. FEELING NERVOUS, ANXIOUS, OR ON EDGE: MORE THAN HALF THE DAYS
7. FEELING AFRAID AS IF SOMETHING AWFUL MIGHT HAPPEN: MORE THAN HALF THE DAYS
3. WORRYING TOO MUCH ABOUT DIFFERENT THINGS: MORE THAN HALF THE DAYS
5. BEING SO RESTLESS THAT IT IS HARD TO SIT STILL: MORE THAN HALF THE DAYS
GAD7 TOTAL SCORE: 13
2. NOT BEING ABLE TO STOP OR CONTROL WORRYING: MORE THAN HALF THE DAYS
IF YOU CHECKED OFF ANY PROBLEMS ON THIS QUESTIONNAIRE, HOW DIFFICULT HAVE THESE PROBLEMS MADE IT FOR YOU TO DO YOUR WORK, TAKE CARE OF THINGS AT HOME, OR GET ALONG WITH OTHER PEOPLE: EXTREMELY DIFFICULT
7. FEELING AFRAID AS IF SOMETHING AWFUL MIGHT HAPPEN: MORE THAN HALF THE DAYS
8. IF YOU CHECKED OFF ANY PROBLEMS, HOW DIFFICULT HAVE THESE MADE IT FOR YOU TO DO YOUR WORK, TAKE CARE OF THINGS AT HOME, OR GET ALONG WITH OTHER PEOPLE?: EXTREMELY DIFFICULT
4. TROUBLE RELAXING: MORE THAN HALF THE DAYS
GAD7 TOTAL SCORE: 13

## 2023-03-20 ASSESSMENT — ENCOUNTER SYMPTOMS
DECREASED CONCENTRATION: 1
DYSPHORIC MOOD: 1
SLEEP DISTURBANCE: 1
NERVOUS/ANXIOUS: 1

## 2023-03-20 ASSESSMENT — PATIENT HEALTH QUESTIONNAIRE - PHQ9
SUM OF ALL RESPONSES TO PHQ QUESTIONS 1-9: 17
SUM OF ALL RESPONSES TO PHQ QUESTIONS 1-9: 17
10. IF YOU CHECKED OFF ANY PROBLEMS, HOW DIFFICULT HAVE THESE PROBLEMS MADE IT FOR YOU TO DO YOUR WORK, TAKE CARE OF THINGS AT HOME, OR GET ALONG WITH OTHER PEOPLE: EXTREMELY DIFFICULT

## 2023-03-20 ASSESSMENT — PAIN SCALES - GENERAL: PAINLEVEL: NO PAIN (0)
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
## 2023-03-20 NOTE — LETTER
[facility]  [address]  [place] [address]  Phone: [phone]    March 20, 2023        Nasrin Vasquez  [address] [address]    To whom it may concern:    RE: Nasrin Vasquez    Patient was seen and treated today at our clinic. Please excuse her from work starting 3/15/23. She has applied for FMLA and those formal papers will be coming.     Please contact me for questions or concerns.      Sincerely,        GIRMA Gong CNP

## 2023-03-20 NOTE — PATIENT INSTRUCTIONS
Please make appointment to see your therapist    Please follow up with your psychiatrist as well.     I will fill out the FMLA papers when you receive them. Please sent them through My Chart.
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
## 2023-03-24 ENCOUNTER — MYC MEDICAL ADVICE (OUTPATIENT)
Dept: FAMILY MEDICINE | Facility: CLINIC | Age: 38
End: 2023-03-24
Payer: COMMERCIAL

## 2023-04-08 ENCOUNTER — MYC MEDICAL ADVICE (OUTPATIENT)
Dept: FAMILY MEDICINE | Facility: CLINIC | Age: 38
End: 2023-04-08
Payer: COMMERCIAL

## 2023-04-08 NOTE — LETTER
[facility] [place]  [address]  [place] [address]  Phone: [phone]    April 12, 2023        Nasrin Vasquez   [address]  [place] [facility] [address]    RE: Nasirn Alexandra was seen in our clinic on March 20.  Please excuse her from work March 15 2023 through April 16, 2023.  Nasrin is currently incapacitated due to her mental health.  Limitations are needed in order for Nasrin to have the ability to care for herself mentally and to prepare her to return to work on April 17, 2023.  During this time she will be receiving therapy and medication adjustments.    Please contact me for questions or concerns.      Sincerely,        GIRMA Gong CNP

## 2023-04-10 ENCOUNTER — TELEPHONE (OUTPATIENT)
Dept: FAMILY MEDICINE | Facility: CLINIC | Age: 38
End: 2023-04-10
Payer: COMMERCIAL

## 2023-04-10 NOTE — TELEPHONE ENCOUNTER
Forms/Letter Request    Type of form/letter: FMLA - Unknown    Have you been seen for this request: No    Do we have the form/letter: Yes: 4/10/23    Who is the form from? Patient    Where did/will the form come from? Patient or family brought in       When is form/letter needed by: asap    How would you like the form/letter returned: Mail  Is this the correct address?: No -[address] [facility] [address]  [address]   [place] [facility] [address]    Patient Notified form requests are processed in 3-5 business days:Yes    Could we send this information to you in [employer] or would you prefer to receive a phone call?:   Patient would like to be contacted via [employer]

## 2023-09-18 ENCOUNTER — OFFICE VISIT (OUTPATIENT)
Dept: FAMILY MEDICINE | Facility: CLINIC | Age: 38
End: 2023-09-18
Payer: COMMERCIAL

## 2023-09-18 VITALS
WEIGHT: 256 LBS | HEART RATE: 61 BPM | OXYGEN SATURATION: 96 % | SYSTOLIC BLOOD PRESSURE: 118 MMHG | DIASTOLIC BLOOD PRESSURE: 74 MMHG | HEIGHT: 70 IN | BODY MASS INDEX: 36.65 KG/M2 | RESPIRATION RATE: 16 BRPM | TEMPERATURE: 98.5 F

## 2023-09-18 DIAGNOSIS — N94.6 PAINFUL MENSTRUAL PERIODS: Primary | ICD-10-CM

## 2023-09-18 PROCEDURE — 99214 OFFICE O/P EST MOD 30 MIN: CPT | Performed by: NURSE PRACTITIONER

## 2023-09-18 RX ORDER — DESOGESTREL AND ETHINYL ESTRADIOL 0.15-0.03
1 KIT ORAL DAILY
Qty: 84 TABLET | Refills: 3 | Status: SHIPPED | OUTPATIENT
Start: 2023-09-18 | End: 2024-05-07

## 2023-09-18 RX ORDER — BUSPIRONE HYDROCHLORIDE 15 MG/1
1 TABLET ORAL
COMMUNITY
Start: 2023-09-11 | End: 2024-05-07

## 2023-09-18 ASSESSMENT — PATIENT HEALTH QUESTIONNAIRE - PHQ9
10. IF YOU CHECKED OFF ANY PROBLEMS, HOW DIFFICULT HAVE THESE PROBLEMS MADE IT FOR YOU TO DO YOUR WORK, TAKE CARE OF THINGS AT HOME, OR GET ALONG WITH OTHER PEOPLE: SOMEWHAT DIFFICULT
SUM OF ALL RESPONSES TO PHQ QUESTIONS 1-9: 2
SUM OF ALL RESPONSES TO PHQ QUESTIONS 1-9: 2

## 2023-09-18 NOTE — PROGRESS NOTES
"  Assessment & Plan     Painful menstrual periods  Ultrasound, start OCP. Follow up if ongoing.   - US Pelvic Complete with Transvaginal  - desogestrel-ethinyl estradiol (APRI) 0.15-30 MG-MCG tablet  Dispense: 84 tablet; Refill: 3      Prescription drug management         BMI:   Estimated body mass index is 37 kg/m  as calculated from the following:    Height as of this encounter: 1.772 m (5' 9.75\").    Weight as of this encounter: 116.1 kg (256 lb).       Verona Ferrari, [facility] PRIOR [place] Ferraro is a 38 year old, presenting for the following health issues:  menstrual concerns        9/18/2023    11:11 AM   Additional Questions   Roomed by Becky SHARMA CMA       History of Present Illness       Reason for visit:  Menstrual concerns  Symptom onset:  More than a month  Symptoms include:  Heavier flow, painful cramping despite pain relievers, loss of sleep, increased anxiety  Symptom intensity:  Severe  Symptom progression:  Staying the same  Had these symptoms before:  No  What makes it worse:  Unsure  What makes it better:  Unsure    She eats 0-1 servings of fruits and vegetables daily.She consumes 1 sweetened beverage(s) daily.She exercises with enough effort to increase her heart rate 30 to 60 minutes per day.  She exercises with enough effort to increase her heart rate 3 or less days per week.   She is taking medications regularly.     Still regular. Every 2 hours changing pad/tampon. Will have clots. First couple days will have cramping that is severe. Difficulty doing job walking as a . Had to call out a couple times from work.            Review of Systems   Constitutional, HEENT, cardiovascular, pulmonary, GI, , musculoskeletal, neuro, skin, endocrine and psych systems are negative, except as otherwise noted.      Objective    /74   Pulse 61   Temp 98.5  F (36.9  C) (Tympanic)   Resp 16   Ht 1.772 m (5' 9.75\")   Wt 116.1 kg (256 lb)   LMP 09/15/2023 " (Approximate)   SpO2 96%   BMI 37.00 kg/m    Body mass index is 37 kg/m .  Physical Exam   GENERAL: healthy, alert and no distress  NECK: no adenopathy, no asymmetry, masses, or scars and thyroid normal to palpation  RESP: lungs clear to auscultation - no rales, rhonchi or wheezes  CV: regular rate and rhythm, normal S1 S2, no S3 or S4, no murmur, click or rub, no peripheral edema and peripheral pulses strong  ABDOMEN: soft, nontender, no hepatosplenomegaly, no masses and bowel sounds normal  MS: no gross musculoskeletal defects noted, no edema              CLAUDETTE Russo     [address] [address]  [email or web address]   Office: [phone]

## 2023-09-19 ENCOUNTER — ANCILLARY PROCEDURE (OUTPATIENT)
Dept: ULTRASOUND IMAGING | Facility: CLINIC | Age: 38
End: 2023-09-19
Attending: NURSE PRACTITIONER
Payer: COMMERCIAL

## 2023-09-19 DIAGNOSIS — N94.6 PAINFUL MENSTRUAL PERIODS: ICD-10-CM

## 2023-09-19 PROCEDURE — 76856 US EXAM PELVIC COMPLETE: CPT | Performed by: OBSTETRICS & GYNECOLOGY

## 2023-09-19 PROCEDURE — 76830 TRANSVAGINAL US NON-OB: CPT | Performed by: OBSTETRICS & GYNECOLOGY

## 2023-09-22 ENCOUNTER — MYC MEDICAL ADVICE (OUTPATIENT)
Dept: FAMILY MEDICINE | Facility: CLINIC | Age: 38
End: 2023-09-22
Payer: COMMERCIAL

## 2023-09-22 DIAGNOSIS — R93.89 ENDOMETRIAL THICKENING ON ULTRASOUND: ICD-10-CM

## 2023-09-22 DIAGNOSIS — N94.6 PAINFUL MENSTRUAL PERIODS: Primary | ICD-10-CM

## 2023-09-22 NOTE — TELEPHONE ENCOUNTER
9/18/2023    Please see my chart message below     Please review and advise     Thank you     Sneha Schultz RN, BSN  [place] [facility]

## 2023-09-30 ENCOUNTER — OFFICE VISIT (OUTPATIENT)
Dept: URGENT CARE | Facility: URGENT CARE | Age: 38
End: 2023-09-30
Payer: COMMERCIAL

## 2023-09-30 VITALS
RESPIRATION RATE: 20 BRPM | SYSTOLIC BLOOD PRESSURE: 113 MMHG | DIASTOLIC BLOOD PRESSURE: 67 MMHG | WEIGHT: 256 LBS | TEMPERATURE: 98.7 F | HEART RATE: 63 BPM | OXYGEN SATURATION: 100 % | BODY MASS INDEX: 37 KG/M2

## 2023-09-30 DIAGNOSIS — S81.832A PUNCTURE WOUND OF LEFT LOWER LEG, INITIAL ENCOUNTER: ICD-10-CM
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
## 2023-09-30 DIAGNOSIS — S81.852A DOG BITE OF LEFT LOWER LEG, INITIAL ENCOUNTER: Primary | ICD-10-CM

## 2023-09-30 DIAGNOSIS — Z02.6 ENCOUNTER RELATED TO WORKER'S COMPENSATION CLAIM: ICD-10-CM

## 2023-09-30 PROCEDURE — 99213 OFFICE O/P EST LOW 20 MIN: CPT | Performed by: FAMILY MEDICINE

## 2023-09-30 NOTE — PATIENT INSTRUCTIONS
Please finish the antibiotic even if you are feeling better.  Watch for worsening signs of infection such as increasing redness increasing warmth increasing pain and any red streaking    Gianna Bonilla MD

## 2023-09-30 NOTE — PROGRESS NOTES
Chief Complaint   Patient presents with    Dog Bite     This afternoon on LLE   Nasrin was seen today for dog bite.    Diagnoses and all orders for this visit:    Dog bite of left lower leg, initial encounter  -     amoxicillin-clavulanate (AUGMENTIN) 875-125 MG tablet; Take 1 tablet by mouth 2 times daily    Encounter related to worker's compensation claim    Puncture wound of left lower leg, initial encounter      Plan   Continue and complete course of antibiotic   Keep changing dressing every 24 hrs   Follow up if  symptoms fail to improve or worsens   Pt understood and agreed with plan       Differential Diagnosis:  Contusion, laceration , puncture wound     Medical Decision Making:  Nasrin Vasquez is a 38 year old female who presents for evaluation of a dog bite to left lower leg .    No signs of foreign body or dog teeth in wound.  Dog is known so will have them observe for signs of rabies;  The wounds were scrubbed and washed out with high pressure irrigation.  Will start augmentin and have them observe for signs of infection (pain, redness, warmth, red streaks, etc).    Started on Augmentin. Tetanus is up-to-date..    X-Ray was not done.    If not improving or if conditions worsens over the next 12-24 hours, go to the Emergency Department        SUBJECTIVE:  Nasrin Vasquez is a 38 year old female who presents with a chief complaint of an animal bite on the lower leg left.  She was bitten by a dog today. 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  Severity: moderate.  Animal's immunizations up to date   Associated symptoms: immediate pain, numbness/tingling distally    last tetanus booster within 10 years    History reviewed. No pertinent past medical history.    Current Outpatient Medications:     amoxicillin-clavulanate (AUGMENTIN) 875-125 MG tablet, Take 1 tablet by mouth 2 times daily, Disp: 20 tablet, Rfl: 0    busPIRone (BUSPAR) 15 MG  tablet, Take 1 tablet by mouth 2 times daily, Disp: , Rfl:     desogestrel-ethinyl estradiol (APRI) 0.15-30 MG-MCG tablet, Take 1 tablet by mouth daily, Disp: 84 tablet, Rfl: 3    propranolol (INDERAL) 10 MG tablet, Take 1-2 tabs, 3 times per day as needed for anxiety and panic., Disp: 30 tablet, Rfl: 0  Social History     Tobacco Use    Smoking status: Never    Smokeless tobacco: Never   Substance Use Topics    Alcohol use: Yes     Alcohol/week: 1.0 standard drink of alcohol     Types: 1 Standard drinks or equivalent per week       ROS:  Review of systems negative except as stated above.    OBJECTIVE:  /67   Pulse 63   Temp 98.7  F (37.1  C)   Resp 20   Wt 116.1 kg (256 lb)   LMP 09/15/2023 (Approximate)   SpO2 100%   BMI 37.00 kg/m    GENERAL: healthy, alert no acute distress  SKIN: abrasion measuring 1.5 cm and puncture wound measuring 1cm of lower leg left medial aspect   MS:extremities normal- no gross deformities noted,  FROM noted in all extremities  NEURO: Normal strength and tone, sensory exam grossly normal,  normal speech and mentation    Gianna Bonilla MD

## 2023-11-15 NOTE — TELEPHONE ENCOUNTER
Called patient to inform her that paperwork was sent to the provided email. Patient checked her email and confirmed that she received form.    Anaid Springer MA on 3/2/2020 at 5:13 PM     Low Dose Naltrexone Counseling- I discussed with the patient the potential risks and side effects of low dose naltrexone including but not limited to: more vivid dreams, headaches, nausea, vomiting, abdominal pain, fatigue, dizziness, and anxiety.

## 2023-12-23 ENCOUNTER — ANCILLARY PROCEDURE (OUTPATIENT)
Dept: GENERAL RADIOLOGY | Facility: CLINIC | Age: 38
End: 2023-12-23
Attending: PHYSICIAN ASSISTANT
Payer: COMMERCIAL

## 2023-12-23 ENCOUNTER — OFFICE VISIT (OUTPATIENT)
Dept: URGENT CARE | Facility: URGENT CARE | Age: 38
End: 2023-12-23
Payer: COMMERCIAL

## 2023-12-23 VITALS
BODY MASS INDEX: 36.13 KG/M2 | HEART RATE: 115 BPM | DIASTOLIC BLOOD PRESSURE: 81 MMHG | WEIGHT: 250 LBS | TEMPERATURE: 103.1 F | OXYGEN SATURATION: 98 % | SYSTOLIC BLOOD PRESSURE: 138 MMHG | RESPIRATION RATE: 18 BRPM

## 2023-12-23 DIAGNOSIS — Z87.01 HX OF BACTERIAL PNEUMONIA: ICD-10-CM

## 2023-12-23 DIAGNOSIS — R07.0 THROAT PAIN: Primary | ICD-10-CM

## 2023-12-23 DIAGNOSIS — R50.9 FEVER, UNSPECIFIED FEVER CAUSE: ICD-10-CM

## 2023-12-23 DIAGNOSIS — J11.1 INFLUENZA-LIKE ILLNESS: ICD-10-CM

## 2023-12-23 LAB
DEPRECATED S PYO AG THROAT QL EIA: NEGATIVE
FLUAV AG SPEC QL IA: NEGATIVE
FLUBV AG SPEC QL IA: NEGATIVE
GROUP A STREP BY PCR: NOT DETECTED
SARS-COV-2 RNA RESP QL NAA+PROBE: POSITIVE
WBC # BLD AUTO: 10.8 10E3/UL (ref 4–11)

## 2023-12-23 PROCEDURE — 87651 STREP A DNA AMP PROBE: CPT | Performed by: PHYSICIAN ASSISTANT

## 2023-12-23 PROCEDURE — 99214 OFFICE O/P EST MOD 30 MIN: CPT | Performed by: PHYSICIAN ASSISTANT

## 2023-12-23 PROCEDURE — 87635 SARS-COV-2 COVID-19 AMP PRB: CPT | Performed by: PHYSICIAN ASSISTANT

## 2023-12-23 PROCEDURE — 87804 INFLUENZA ASSAY W/OPTIC: CPT | Performed by: PHYSICIAN ASSISTANT

## 2023-12-23 PROCEDURE — 85048 AUTOMATED LEUKOCYTE COUNT: CPT | Performed by: PHYSICIAN ASSISTANT

## 2023-12-23 PROCEDURE — 36415 COLL VENOUS BLD VENIPUNCTURE: CPT | Performed by: PHYSICIAN ASSISTANT

## 2023-12-23 PROCEDURE — 71046 X-RAY EXAM CHEST 2 VIEWS: CPT | Mod: TC | Performed by: RADIOLOGY

## 2023-12-23 RX ORDER — OSELTAMIVIR PHOSPHATE 75 MG/1
75 CAPSULE ORAL 2 TIMES DAILY
Qty: 10 CAPSULE | Refills: 0 | Status: SHIPPED | OUTPATIENT
Start: 2023-12-23 | End: 2023-12-28

## 2023-12-23 RX ORDER — BENZONATATE 200 MG/1
200 CAPSULE ORAL 3 TIMES DAILY PRN
Qty: 21 CAPSULE | Refills: 0 | Status: SHIPPED | OUTPATIENT
Start: 2023-12-23 | End: 2023-12-30

## 2023-12-23 RX ORDER — ACETAMINOPHEN 500 MG
500-1000 TABLET ORAL EVERY 6 HOURS PRN
Qty: 30 TABLET | Refills: 0 | Status: SHIPPED | OUTPATIENT
Start: 2023-12-23 | End: 2024-09-05

## 2023-12-23 RX ORDER — IBUPROFEN 600 MG/1
600 TABLET, FILM COATED ORAL EVERY 6 HOURS PRN
Qty: 30 TABLET | Refills: 0 | Status: SHIPPED | OUTPATIENT
Start: 2023-12-23 | End: 2024-09-05

## 2023-12-23 NOTE — PROGRESS NOTES
Assessment & Plan     Throat pain    You had a strep test done today that was neg.  We will perform a strep culture and if any positive results come back we will call you and call in antibiotics.  At this time, this appears to be a viral infection and requires symptomatic treatment.  Most viral sore throats will resolve with in a few days.  If your throat pain worsens then please be  rechecked in the  or by your PCP.    Strep neg, culture pending  Motrin for throat pain    - Streptococcus A Rapid Screen w/Reflex to PCR - Clinic Collect  - Group A Streptococcus PCR Throat Swab  - ibuprofen (ADVIL/MOTRIN) 600 MG tablet; Take 1 tablet (600 mg) by mouth every 6 hours as needed    Influenza-like illness    Patient given information about influenza.  Patient understands they are contagious until their fever has resolved without the use of motrin or tylenol.  At that time they can return to school/work.  Patient is to monitor for any worsening symptoms and return to the clinic if this occurs.  The most common complication of influenza is Pneumonia or other respiratory problems especially in those with underlying lung problems including asthma and COPD.  Patient will follow up if this occurs.    Covid pending  Start on medications:    - oseltamivir (TAMIFLU) 75 MG capsule; Take 1 capsule (75 mg) by mouth 2 times daily for 5 days  - benzonatate (TESSALON) 200 MG capsule; Take 1 capsule (200 mg) by mouth 3 times daily as needed    Fever, unspecified fever cause    A fever is a high body temperature and is the body's reaction to an illness. It's one way your body fights illness. A temperature of up to 102 F can be helpful, because it helps the body respond to infection. Most healthy people can have a fever as high as 103 F to 104 F for short periods of time without problems.  Its important to stay well hydrated with a fever and avoid being in the heat.  A fever can be treated with medications provided, but If symptoms  "worsen then be seen by your PCP or go to the .       - ibuprofen (ADVIL/MOTRIN) 600 MG tablet; Take 1 tablet (600 mg) by mouth every 6 hours as needed  - acetaminophen (TYLENOL) 500 MG tablet; Take 1-2 tablets (500-1,000 mg) by mouth every 6 hours as needed for mild pain    Hx of bacterial pneumonia    Chest xray Negative for acute findings, read by Omer LINDSAY at time of visit.  Lung exam neg  WBC is normal    - XR Chest 2 Views; Future  - WBC count; Future      Review of external notes as documented elsewhere in note       BMI:   Estimated body mass index is 36.13 kg/m  as calculated from the following:    Height as of 9/18/23: 1.772 m (5' 9.75\").    Weight as of this encounter: 113.4 kg (250 lb).       At today's visit with Nasrin Vasquez , we discussed results, diagnosis, medications and formulated a plan.  We also discussed red flags for immediate return to clinic/ER, as well as indications for follow up with PCP if not improved in 3 days. Patient understood and agreed to plan. Nasrin Vasquez was discharged with stable vitals and has no further questions.       No follow-ups on file.    Omer Taylor, [facility], HEMA  [facility] MIMI Ferraro is a 38 year old, presenting for the following health issues:  Cough, Pharyngitis, Fever, and Musculoskeletal Problem (Body aches)      HPI   Review of Systems   Constitutional, HEENT, cardiovascular, pulmonary, GI, , musculoskeletal, neuro, skin, endocrine and psych systems are negative, except as otherwise noted.      Objective    /81   Pulse 115   Temp (!) 103.1  F (39.5  C)   Resp 18   Wt 113.4 kg (250 lb)   LMP  (LMP Unknown)   SpO2 98%   BMI 36.13 kg/m    Body mass index is 36.13 kg/m .  Physical Exam   GENERAL: healthy, alert and no distress  EYES: Eyes grossly normal to inspection, PERRL and conjunctivae and sclerae normal  HENT: ear canals and TM's normal, nose and mouth without ulcers or " lesions  NECK: no adenopathy, no asymmetry, masses, or scars and thyroid normal to palpation  RESP: lungs clear to auscultation - no rales, rhonchi or wheezes  CV: regular rate and rhythm, normal S1 S2, no S3 or S4, no murmur, click or rub, no peripheral edema and peripheral pulses strong  ABDOMEN: soft, nontender, no hepatosplenomegaly, no masses and bowel sounds normal  MS: no gross musculoskeletal defects noted, no edema  SKIN: no suspicious lesions or rashes  NEURO: Normal strength and tone, mentation intact and speech normal  PSYCH: mentation appears normal, affect normal/bright        Results for orders placed or performed in visit on 12/23/23   XR Chest 2 Views     Status: None    Narrative    EXAM: XR CHEST 2 VIEWS  LOCATION: [facility]  DATE: 12/23/2023    INDICATION:  Hx of bacterial pneumonia  COMPARISON: None.      Impression    IMPRESSION: Negative chest.   Results for orders placed or performed in visit on 12/23/23   WBC count     Status: Normal   Result Value Ref Range    WBC Count 10.8 4.0 - 11.0 10e3/uL   Influenza A & B Antigen - Clinic Collect     Status: Normal    Specimen: Nose; Swab   Result Value Ref Range    Influenza A antigen Negative Negative    Influenza B antigen Negative Negative    Narrative    Test results must be correlated with clinical data. If necessary, results should be confirmed by a molecular assay or viral culture.   Streptococcus A Rapid Screen w/Reflex to PCR - Clinic Collect     Status: Normal    Specimen: Throat; Swab   Result Value Ref Range    Group A Strep antigen Negative Negative

## 2023-12-24 ENCOUNTER — TELEPHONE (OUTPATIENT)
Dept: INTERNAL MEDICINE | Facility: CLINIC | Age: 38
End: 2023-12-24
Payer: COMMERCIAL

## 2023-12-24 ENCOUNTER — TELEPHONE (OUTPATIENT)
Dept: NURSING | Facility: CLINIC | Age: 38
End: 2023-12-24
Payer: COMMERCIAL

## 2023-12-24 ENCOUNTER — NURSE TRIAGE (OUTPATIENT)
Dept: NURSING | Facility: CLINIC | Age: 38
End: 2023-12-24
Payer: COMMERCIAL

## 2023-12-24 DIAGNOSIS — U07.1 INFECTION DUE TO 2019 NOVEL CORONAVIRUS: Primary | ICD-10-CM

## 2023-12-24 NOTE — TELEPHONE ENCOUNTER
"  COVID Positive/Requesting COVID treatment    Patient is positive for COVID and requesting treatment options.    Date of positive COVID test (PCR or at home)? 12/23/2023  Current COVID symptoms: fever or chills, cough, shortness of breath or difficulty breathing, muscle or body aches, headache, and congestion or runny nose  Date COVID symptoms began: 12/20/2023    Message should be routed to clinic RN pool. Best phone number to use for call back: [phone]    Nurse Triage SBAR    Is this a 2nd Level Triage? YES, LICENSED PRACTITIONER REVIEW IS REQUIRED    Situation: COVID positive, having symptoms and interested in treatment options.     Background: Patient reports symptoms began on 12/20. She was seen in  yesterday and tested positive for COVID. She had labs and a chest X-Ray done at  yesterday.     Assessment: Reports she had a fever of 103 yesterday, but today T 98.7. Also reports coughing, body aches, runny nose, and headaches on and off. Denies any headache at this time. She reports chest pain rated 4/10, present only when coughing. Also reports SOB, only with activity. She is on day 4 of symptoms. Weight 250 lbs, height 5'10\". BMI 35.8. Reports drinking fluids well. She is not bedridden.     Protocol Recommended Disposition:   Go to ED Now (Or PCP Triage)    Recommendation: Page sent to on-call provider, Dr. Bryant Joseph, for further recommendation. Called patient back and finished triage. Patient eligible to be routed to [facility] for Paxlovid treatment     Paged to provider at 10:36 am. On-call provider recommends routing to pool for Paxlovid treatment. He believes chest pain and SOB with activity d/t coughing and COVID. Patient had chest X-Ray done yesterday.     Provider Recommendation Follow Up:   Reached patient/caregiver. Informed of provider's recommendations. Patient verbalized understanding and agrees with the plan. Patient eligible to be routed to [facility] for Paxlovid treatment. Routed " high-priority to COVID pool. Per COVID protocol RN, COVID pool unable to prescribe Paxlovid when there is SOB or chest pain present, even when approved by MD. Page sent to on-call provider, Dr. Bryant Joseph, to notify him of the above. Also placed call to patient and notified her of the above. Advised her to go back to [facility] for Paxlovid. She states she does not think she'll do that. Advised her if she decides to go to , to wear a mask and notify staff right away of her positive test results. Also advised her to message her PCP via [employer] to discuss Paxlovid treatment, but let her know this wouldn't be seen until Tuesday.         Does the patient meet one of the following criteria for ADS visit consideration? 16+ years old, with an [facility] PCP     TIP  Providers, please consider if this condition is appropriate for management at one of our Acute and Diagnostic Services sites.     If patient is a good candidate, please use dotphrase <dot>triageresponse and select Refer to ADS to document.      Reason for Disposition   Chest pain or pressure  (Exception: MILD central chest pain, present only when coughing.)    Additional Information   Negative: SEVERE difficulty breathing (e.g., struggling for each breath, speaks in single words)   Negative: Difficult to awaken or acting confused (e.g., disoriented, slurred speech)   Negative: Bluish (or gray) lips or face now   Negative: Shock suspected (e.g., cold/pale/clammy skin, too weak to stand, low BP, rapid pulse)   Negative: Sounds like a life-threatening emergency to the triager   Negative: [1] Diagnosed or suspected COVID-19 AND [2] symptoms lasting 3 or more weeks   Negative: [1] COVID-19 exposure AND [2] no symptoms   Negative: COVID-19 vaccine reaction suspected (e.g., fever, headache, muscle aches) occurring 1 to 3 days after getting vaccine   Negative: COVID-19 vaccine, questions about   Negative: [1] Lives with someone known to have influenza (flu test  positive) AND [2] flu-like symptoms (e.g., cough, runny nose, sore throat, SOB; with or without fever)   Negative: [1] Possible COVID-19 symptoms AND [2] triager concerned about severity of symptoms or other causes   Negative: COVID-19 and breastfeeding, questions about   Negative: SEVERE or constant chest pain or pressure  (Exception: Mild central chest pain, present only when coughing.)   Negative: MODERATE difficulty breathing (e.g., speaks in phrases, SOB even at rest, pulse 100-120)   Negative: [1] Headache AND [2] stiff neck (can't touch chin to chest)   Negative: Oxygen level (e.g., pulse oximetry) 90 percent or lower     No pulse oximeter available.   Commented on: Oxygen level (e.g., pulse oximetry) 91 to 94 percent     No pulse oximeter available    Protocols used: Coronavirus (COVID-19) Diagnosed or [id number]

## 2023-12-24 NOTE — TELEPHONE ENCOUNTER
"Received notification about this patient who had tested positive for COVID yesterday in [facility], and today is complaining of dyspnea and chest pain.  Her chest pain is well-localized, and worse with deep inspiration and coughing.    In discussing with the RN initially, I had OKed the use of Paxlovid per RN protocol.  Apparently, the RN Protocol does not allow them to prescribe the medication if the symptoms of \"chest pain\" or \"shortness of breath\" are present.    Spoke with the patient by phone.  She is in no distress and her \"chest pain,\" as described above, seems pleuritic in nature, which is not unexpected.  That said, given her BMI, I do recommend that she start Paxlovid as soon as she can - sent prescription to her preferred pharmacy.    Patient agreed to plan.  "

## 2023-12-24 NOTE — TELEPHONE ENCOUNTER
Patient classified as COVID treatment eligible by Epic high risk algorithm:  Yes    Coronavirus (COVID-19) Notification    Reason for call  Notify of POSITIVE COVID-19 lab result, assess symptoms,  review [facility] recommendations    Lab Result   Lab test for 2019-nCoV rRt-PCR or SARS-COV-2 PCR  Oropharyngeal AND/OR nasopharyngeal swabs were POSITIVE for 2019-nCoV RNA [OR] SARS-COV-2 RNA (COVID-19) RNA     We have been unable to reach patient by phone at this time to notify of their Positive COVID-19 result.    Left voicemail message requesting a call back to [phone] [facility] for results.        A Positive COVID-19 letter will be sent via [employer] or the mail.    Monet Frederick

## 2024-03-05 NOTE — LETTER
2020         RE: Nasrin Vasquez  [address]  [place] [id number]        Dear Colleague,    Thank you for referring your patient, Nasrin Vasquez, to the [facility]. Please see a copy of my visit note below.      ASSESSMENT/PLAN:    Encounter Diagnosis   Name Primary?     Acute left ankle pain Yes     I reviewed the x-ray images from urgent care with her.  The relevant anatomy of the ankle joint was discussed.    Her mechanism of injury was described as an inversion ankle injury of the left ankle.  However, clinical exam is not fully consistent with this type of injury.  She had more generalized pain with palpation around the ankle including the Achilles tendon.    A Tri-Lock brace was provided to help offload the ankle.    Because her work requires prolonged weightbearing, custom orthoses were recommended and prescribed.    I expect her pain to resolve.  I have asked her to return in 3 to 4 weeks.  If she continues to have discomfort I will consider advanced imaging and/or physical therapy.      Body mass index is 33.91 kg/m .    Weight management plan: Patient was referred to their PCP to discuss a diet and exercise plan.      Rajat Banks DPM, FACFAS, MS    [facility]      ____________________________________________________________________    HPI:         Chief Complaint: Follow-up from urgent care for a left ankle injury and pain.  Onset of problem: 1 week ago she inverted her left ankle and is concerned that she is still having pain.  Pain/ discomfort is described as: Throbbing in foot but with rest.  Occasional pain in the lower calf.  Deep ache when walking.  Ratin out of 10 at worst  Frequency:  daily    The pain is made worse with prolonged walking  Previous treatment: ibuprofen, elevation, ice.  Urgent care 20. XR completed.     She describes left ankle pain medially, laterally and posterior.*  Patient Active  Problem List   Diagnosis     CARDIOVASCULAR SCREENING; LDL GOAL LESS THAN 160   *  *No past surgical history on file.*  *  Current Outpatient Medications   Medication Sig Dispense Refill     ibuprofen (ADVIL,MOTRIN) 200 MG tablet Take 3 tablets by mouth as needed         ROS:     A 10-point review of systems was performed and is positive for that noted above in the HPI and as seen below.  All other areas are negative.     Numbness in feet?  no   Calf pain with walking? yes  Recent foot/ankle injury? yno  Weight change over past 12 months? no  Self perception as overweight? yes  Recent flu-like symptoms? no  Joint pain other than feet ? knees    Social History: Employment:  [employer] at [employer] 8-12 hours per day at work;  Exercise/Physical activity:  no;  Tobacco use:  no  Social History     Socioeconomic History     Marital status: Single     Spouse name: Not on file     Number of children: Not on file     Years of education: Not on file     Highest education level: Not on file   Occupational History     Not on file   Social Needs     Financial resource strain: Not on file     Food insecurity:     Worry: Not on file     Inability: Not on file     Transportation needs:     Medical: Not on file     Non-medical: Not on file   Tobacco Use     Smoking status: Never Smoker     Smokeless tobacco: Never Used   Substance and Sexual Activity     Alcohol use: No     Drug use: No     Sexual activity: Never   Lifestyle     Physical activity:     Days per week: Not on file     Minutes per session: Not on file     Stress: Not on file   Relationships     Social connections:     Talks on phone: Not on file     Gets together: Not on file     Attends Hindu service: Not on file     Active member of club or organization: Not on file     Attends meetings of clubs or organizations: Not on file     Relationship status: Not on file     Intimate partner violence:     Fear of current or ex partner: Not on file     Emotionally abused:  "Not on file     Physically abused: Not on file     Forced sexual activity: Not on file   Other Topics Concern     Not on file   Social History Narrative     Not on file       Family history:  Family History   Problem Relation Age of Onset     Asthma Brother      Cancer Maternal Grandmother      Cancer Maternal Grandfather        Rheumatoid arthritis:  no  Foot Problems: no  Diabetes: no      EXAM:    Vitals: /70   Ht 1.765 m (5' 9.5\")   Wt 105.7 kg (233 lb)   BMI 33.91 kg/m     BMI: Body mass index is 33.91 kg/m .  Height: 5' 9.5\"    Constitutional/ general:  Pt is in no apparent distress, appears well-nourished.  Cooperative with history and physical exam.     Vascular:  Pedal pulses are palpable bilaterally for both the DP and PT arteries.  CFT < 3 sec.  No edema.  Pedal hair growth noted.     Neuro:  Alert and oriented x 3. Coordinated gait.  Light touch sensation is intact to the L4, L5, S1 distributions. No obvious deficits.  No evidence of neurological-based weakness, spasticity, or contracture in the lower extremities.     Derm: Normal texture and turgor.  No erythema, ecchymosis, or cyanosis.  No open lesions.     Musculoskeletal:    Lower extremity muscle strength is normal.  Patient is ambulatory without an assistive device or brace .  No gross deformities - rectus left  foot with weight bearing.  Smooth right ankle ROM. Pain with inversion and eversion of her left foot against resistance. Pain on palpation to the left Achilles tendon.     Radiographic Exam:  X-Ray Findings:  I personally reviewed the 2/22/20 films.      XR ANKLE LT G/E 3 VW 2/22/2020 10:19 AM      HISTORY: Ankle injury, left, initial encounter     COMPARISON: None.                                                                      IMPRESSION: No fractures are identified. The ankle mortise is intact.  The talar dome is unremarkable. Small plantar and Achilles calcaneal  spurs.      RACHELLE PHAM MD      Again, thank you for " allowing me to participate in the care of your patient.        Sincerely,        Rajat Banks, HARINDER     [Dysuria] : no dysuria [Incontinence] : no incontinence [Vaginal Discharge] : no vaginal discharge [Dysmenorrhea/Abn Vaginal Bleeding] : no dysmenorrhea/abnormal vaginal bleeding [Skin Rash] : no skin rash [Skin Wound] : no skin wound [FreeTextEntry8] : vaginal dryness  [de-identified] : swelling over the lumpectomy sites

## 2024-04-07 ENCOUNTER — HEALTH MAINTENANCE LETTER (OUTPATIENT)
Age: 39
End: 2024-04-07

## 2024-05-06 SDOH — HEALTH STABILITY: PHYSICAL HEALTH: ON AVERAGE, HOW MANY MINUTES DO YOU ENGAGE IN EXERCISE AT THIS LEVEL?: 150+ MIN

## 2024-05-06 SDOH — HEALTH STABILITY: PHYSICAL HEALTH: ON AVERAGE, HOW MANY DAYS PER WEEK DO YOU ENGAGE IN MODERATE TO STRENUOUS EXERCISE (LIKE A BRISK WALK)?: 2 DAYS

## 2024-05-06 ASSESSMENT — ANXIETY QUESTIONNAIRES
6. BECOMING EASILY ANNOYED OR IRRITABLE: SEVERAL DAYS
4. TROUBLE RELAXING: NOT AT ALL
3. WORRYING TOO MUCH ABOUT DIFFERENT THINGS: SEVERAL DAYS
GAD7 TOTAL SCORE: 5
7. FEELING AFRAID AS IF SOMETHING AWFUL MIGHT HAPPEN: SEVERAL DAYS
IF YOU CHECKED OFF ANY PROBLEMS ON THIS QUESTIONNAIRE, HOW DIFFICULT HAVE THESE PROBLEMS MADE IT FOR YOU TO DO YOUR WORK, TAKE CARE OF THINGS AT HOME, OR GET ALONG WITH OTHER PEOPLE: SOMEWHAT DIFFICULT
GAD7 TOTAL SCORE: 5
GAD7 TOTAL SCORE: 5
2. NOT BEING ABLE TO STOP OR CONTROL WORRYING: SEVERAL DAYS
7. FEELING AFRAID AS IF SOMETHING AWFUL MIGHT HAPPEN: SEVERAL DAYS
5. BEING SO RESTLESS THAT IT IS HARD TO SIT STILL: NOT AT ALL
8. IF YOU CHECKED OFF ANY PROBLEMS, HOW DIFFICULT HAVE THESE MADE IT FOR YOU TO DO YOUR WORK, TAKE CARE OF THINGS AT HOME, OR GET ALONG WITH OTHER PEOPLE?: SOMEWHAT DIFFICULT
1. FEELING NERVOUS, ANXIOUS, OR ON EDGE: SEVERAL DAYS

## 2024-05-06 ASSESSMENT — PATIENT HEALTH QUESTIONNAIRE - PHQ9
SUM OF ALL RESPONSES TO PHQ QUESTIONS 1-9: 1
SUM OF ALL RESPONSES TO PHQ QUESTIONS 1-9: 1
10. IF YOU CHECKED OFF ANY PROBLEMS, HOW DIFFICULT HAVE THESE PROBLEMS MADE IT FOR YOU TO DO YOUR WORK, TAKE CARE OF THINGS AT HOME, OR GET ALONG WITH OTHER PEOPLE: NOT DIFFICULT AT ALL

## 2024-05-06 ASSESSMENT — SOCIAL DETERMINANTS OF HEALTH (SDOH): HOW OFTEN DO YOU GET TOGETHER WITH FRIENDS OR RELATIVES?: ONCE A WEEK

## 2024-05-07 ENCOUNTER — OFFICE VISIT (OUTPATIENT)
Dept: FAMILY MEDICINE | Facility: CLINIC | Age: 39
End: 2024-05-07
Payer: COMMERCIAL

## 2024-05-07 VITALS
RESPIRATION RATE: 14 BRPM | HEART RATE: 60 BPM | WEIGHT: 258 LBS | OXYGEN SATURATION: 97 % | BODY MASS INDEX: 38.21 KG/M2 | DIASTOLIC BLOOD PRESSURE: 78 MMHG | SYSTOLIC BLOOD PRESSURE: 126 MMHG | TEMPERATURE: 98.8 F | HEIGHT: 69 IN

## 2024-05-07 DIAGNOSIS — Z13.1 SCREENING FOR DIABETES MELLITUS: ICD-10-CM

## 2024-05-07 DIAGNOSIS — F33.2 MAJOR DEPRESSIVE DISORDER, RECURRENT EPISODE, SEVERE WITH ANXIOUS DISTRESS (H): ICD-10-CM

## 2024-05-07 DIAGNOSIS — Z13.21 ENCOUNTER FOR VITAMIN DEFICIENCY SCREENING: ICD-10-CM

## 2024-05-07 DIAGNOSIS — H61.23 IMPACTED CERUMEN OF BOTH EARS: ICD-10-CM

## 2024-05-07 DIAGNOSIS — Z13.0 SCREENING FOR DEFICIENCY ANEMIA: ICD-10-CM

## 2024-05-07 DIAGNOSIS — Z00.00 ROUTINE PHYSICAL EXAMINATION: Primary | ICD-10-CM

## 2024-05-07 DIAGNOSIS — G43.909 MIGRAINE WITHOUT STATUS MIGRAINOSUS, NOT INTRACTABLE, UNSPECIFIED MIGRAINE TYPE: ICD-10-CM

## 2024-05-07 DIAGNOSIS — N94.6 PAINFUL MENSTRUAL PERIODS: ICD-10-CM

## 2024-05-07 DIAGNOSIS — Z13.220 SCREENING CHOLESTEROL LEVEL: ICD-10-CM

## 2024-05-07 DIAGNOSIS — H61.23 BILATERAL IMPACTED CERUMEN: ICD-10-CM

## 2024-05-07 DIAGNOSIS — Z13.29 THYROID DISORDER SCREENING: ICD-10-CM

## 2024-05-07 LAB
ALBUMIN SERPL BCG-MCNC: 3.8 G/DL (ref 3.5–5.2)
ALP SERPL-CCNC: 99 U/L (ref 40–150)
ALT SERPL W P-5'-P-CCNC: 14 U/L (ref 0–50)
ANION GAP SERPL CALCULATED.3IONS-SCNC: 7 MMOL/L (ref 7–15)
AST SERPL W P-5'-P-CCNC: 15 U/L (ref 0–45)
BILIRUB SERPL-MCNC: 0.6 MG/DL
BUN SERPL-MCNC: 14.1 MG/DL (ref 6–20)
CALCIUM SERPL-MCNC: 9.1 MG/DL (ref 8.6–10)
CHLORIDE SERPL-SCNC: 108 MMOL/L (ref 98–107)
CHOLEST SERPL-MCNC: 201 MG/DL
CREAT SERPL-MCNC: 0.85 MG/DL (ref 0.51–0.95)
DEPRECATED HCO3 PLAS-SCNC: 23 MMOL/L (ref 22–29)
EGFRCR SERPLBLD CKD-EPI 2021: 90 ML/MIN/1.73M2
ERYTHROCYTE [DISTWIDTH] IN BLOOD BY AUTOMATED COUNT: 12 % (ref 10–15)
FASTING STATUS PATIENT QL REPORTED: YES
GLUCOSE SERPL-MCNC: 105 MG/DL (ref 70–99)
HBA1C MFR BLD: 5.2 % (ref 0–5.6)
HCT VFR BLD AUTO: 41 % (ref 35–47)
HDLC SERPL-MCNC: 76 MG/DL
HGB BLD-MCNC: 13.9 G/DL (ref 11.7–15.7)
LDLC SERPL CALC-MCNC: 114 MG/DL
MCH RBC QN AUTO: 30.2 PG (ref 26.5–33)
MCHC RBC AUTO-ENTMCNC: 33.9 G/DL (ref 31.5–36.5)
MCV RBC AUTO: 89 FL (ref 78–100)
NONHDLC SERPL-MCNC: 125 MG/DL
PLATELET # BLD AUTO: 273 10E3/UL (ref 150–450)
POTASSIUM SERPL-SCNC: 4 MMOL/L (ref 3.4–5.3)
PROT SERPL-MCNC: 7.1 G/DL (ref 6.4–8.3)
RBC # BLD AUTO: 4.61 10E6/UL (ref 3.8–5.2)
SODIUM SERPL-SCNC: 138 MMOL/L (ref 135–145)
TRIGL SERPL-MCNC: 56 MG/DL
TSH SERPL DL<=0.005 MIU/L-ACNC: 1.62 UIU/ML (ref 0.3–4.2)
VIT D+METAB SERPL-MCNC: 45 NG/ML (ref 20–50)
WBC # BLD AUTO: 5.7 10E3/UL (ref 4–11)

## 2024-05-07 PROCEDURE — 80053 COMPREHEN METABOLIC PANEL: CPT | Performed by: NURSE PRACTITIONER

## 2024-05-07 PROCEDURE — 69209 REMOVE IMPACTED EAR WAX UNI: CPT | Mod: 50 | Performed by: NURSE PRACTITIONER

## 2024-05-07 PROCEDURE — 83036 HEMOGLOBIN GLYCOSYLATED A1C: CPT | Performed by: NURSE PRACTITIONER

## 2024-05-07 PROCEDURE — 99395 PREV VISIT EST AGE 18-39: CPT | Performed by: NURSE PRACTITIONER

## 2024-05-07 PROCEDURE — 80061 LIPID PANEL: CPT | Performed by: NURSE PRACTITIONER

## 2024-05-07 PROCEDURE — 84443 ASSAY THYROID STIM HORMONE: CPT | Performed by: NURSE PRACTITIONER

## 2024-05-07 PROCEDURE — 36415 COLL VENOUS BLD VENIPUNCTURE: CPT | Performed by: NURSE PRACTITIONER

## 2024-05-07 PROCEDURE — 85027 COMPLETE CBC AUTOMATED: CPT | Performed by: NURSE PRACTITIONER

## 2024-05-07 PROCEDURE — 82306 VITAMIN D 25 HYDROXY: CPT | Performed by: NURSE PRACTITIONER

## 2024-05-07 PROCEDURE — 99213 OFFICE O/P EST LOW 20 MIN: CPT | Mod: 25 | Performed by: NURSE PRACTITIONER

## 2024-05-07 RX ORDER — ONDANSETRON 4 MG/1
4 TABLET, ORALLY DISINTEGRATING ORAL EVERY 8 HOURS PRN
Qty: 30 TABLET | Refills: 0 | Status: SHIPPED | OUTPATIENT
Start: 2024-05-07

## 2024-05-07 RX ORDER — DESOGESTREL AND ETHINYL ESTRADIOL 0.15-0.03
1 KIT ORAL DAILY
Qty: 84 TABLET | Refills: 3 | Status: SHIPPED | OUTPATIENT
Start: 2024-05-07

## 2024-05-07 RX ORDER — SUMATRIPTAN 25 MG/1
25 TABLET, FILM COATED ORAL
Qty: 18 TABLET | Refills: 0 | Status: SHIPPED | OUTPATIENT
Start: 2024-05-07

## 2024-05-07 RX ORDER — ACETAMINOPHEN 160 MG
1 TABLET,DISINTEGRATING ORAL SEE ADMIN INSTRUCTIONS
Status: SHIPPED | OUTPATIENT
Start: 2024-05-14

## 2024-05-07 ASSESSMENT — PAIN SCALES - GENERAL: PAINLEVEL: NO PAIN (0)

## 2024-05-07 NOTE — PROGRESS NOTES
"Preventive Care Visit  [facility]  Verona Ferrari CNP, Nurse Practitioner - Family  May 7, 2024      Assessment & Plan     Routine physical examination    Screening cholesterol level  - Lipid panel reflex to direct LDL Fasting  - Lipid panel reflex to direct LDL Fasting    Screening for deficiency anemia  - CBC with platelets  - CBC with platelets    Screening for diabetes mellitus  - Comprehensive metabolic panel (BMP + Alb, Alk Phos, ALT, AST, Total. Bili, TP)  - Hemoglobin A1c  - Comprehensive metabolic panel (BMP + Alb, Alk Phos, ALT, AST, Total. Bili, TP)  - Hemoglobin A1c    Thyroid disorder screening  - TSH with free T4 reflex  - TSH with free T4 reflex    Encounter for vitamin deficiency screening    - Vitamin D Deficiency  - Vitamin D Deficiency    Migraine without status migrainosus, not intractable, unspecified migraine type  Trial below for migraine management. Let us know if not helpful.  - ondansetron (ZOFRAN ODT) 4 MG ODT tab  Dispense: 30 tablet; Refill: 0  - SUMAtriptan (IMITREX) 25 MG tablet  Dispense: 18 tablet; Refill: 0    Painful menstrual periods  Discussed association with migraines. They did not start when OCP was started.   - desogestrel-ethinyl estradiol (APRI) 0.15-30 MG-MCG tablet  Dispense: 84 tablet; Refill: 3    Bilateral impacted cerumen  - hydrogen peroxide 3 % solution 1 mL    Impacted cerumen of both ears    Major depressive disorder, recurrent episode, severe with anxious distress (H)  Stable currently.      Patient has been advised of split billing requirements and indicates understanding: Yes  Review of the result(s) of each unique test - lab  Ordering of each unique test  Prescription drug management        BMI  Estimated body mass index is 38.1 kg/m  as calculated from the following:    Height as of this encounter: 1.753 m (5' 9\").    Weight as of this encounter: 117 kg (258 lb).   Weight management plan: Discussed healthy diet and exercise " guidelines    Counseling  Appropriate preventive services were discussed with this patient, including applicable screening as appropriate for fall prevention, nutrition, physical activity, Tobacco-use cessation, weight loss and cognition.  Checklist reviewing preventive services available has been given to the patient.  Reviewed patient's diet, addressing concerns and/or questions.   She is at risk for lack of exercise and has been provided with information to increase physical activity for the benefit of her well-being.   The patient was instructed to see the dentist every 6 months.   She is at risk for psychosocial distress and has been provided with information to reduce risk.       See Patient Instructions    Roge Ferarro is a 38 year old, presenting for the following:  Physical        5/7/2024     8:08 AM   Additional Questions   Roomed by SILVESTRE MACHUCA   Accompanied by SELF        Patient identified using two patient identifiers.  Ear exam showing wax occlusion completed by provider.  H202/H20 was placed in the bilateral ear(s) via irrigation tool: elephant ear.      HPI    Migraines around the time of her period lasting 1-2 days. No aura. Ibuprofen helps but not always. Wondering about management strategies.    Anxiety   How are you doing with your anxiety since your last visit? No change  Have you had a significant life event? OTHER: dog fear    Are you feeling depressed? Yes:  with periods   Do you have any concerns with your use of alcohol or other drugs? No    Social History     Tobacco Use    Smoking status: Never    Smokeless tobacco: Never   Substance Use Topics    Alcohol use: Yes     Alcohol/week: 1.0 standard drink of alcohol     Types: 1 Standard drinks or equivalent per week    Drug use: Never         2/22/2023    11:06 AM 3/20/2023     1:03 PM 5/6/2024    10:57 PM   WALT-7 SCORE   Total Score  13 (moderate anxiety) 5 (mild anxiety)   Total Score 10 13 5         8/14/2023     6:14 PM 9/18/2023      6:58 AM 5/6/2024    10:56 PM   PHQ   PHQ-9 Total Score 6 2 1   Q9: Thoughts of better off dead/self-harm past 2 weeks Not at all Not at all Not at all         5/6/2024    10:56 PM   Last PHQ-9   1.  Little interest or pleasure in doing things 0   2.  Feeling down, depressed, or hopeless 0   3.  Trouble falling or staying asleep, or sleeping too much 0   4.  Feeling tired or having little energy 1   5.  Poor appetite or overeating 0   6.  Feeling bad about yourself 0   7.  Trouble concentrating 0   8.  Moving slowly or restless 0   Q9: Thoughts of better off dead/self-harm past 2 weeks 0   PHQ-9 Total Score 1         5/6/2024    10:57 PM   WALT-7    1. Feeling nervous, anxious, or on edge 1   2. Not being able to stop or control worrying 1   3. Worrying too much about different things 1   4. Trouble relaxing 0   5. Being so restless that it is hard to sit still 0   6. Becoming easily annoyed or irritable 1   7. Feeling afraid, as if something awful might happen 1   WALT-7 Total Score 5   If you checked any problems, how difficult have they made it for you to do your work, take care of things at home, or get along with other people? Somewhat difficult           5/6/2024   General Health   How would you rate your overall physical health? (!) POOR   Feel stress (tense, anxious, or unable to sleep) Only a little   (!) STRESS CONCERN      5/6/2024   Nutrition   Three or more servings of calcium each day? (!) NO   Diet: Regular (no restrictions)   How many servings of fruit and vegetables per day? (!) 0-1   How many sweetened beverages each day? 0-1         5/6/2024   Exercise   Days per week of moderate/strenous exercise 2 days   Average minutes spent exercising at this level 150+ min   (!) EXERCISE CONCERN      5/6/2024   Social Factors   Frequency of gathering with friends or relatives Once a week   Worry food won't last until get money to buy more No   Food not last or not have enough money for food? No   Do you have  housing?  Yes   Are you worried about losing your housing? No   Lack of transportation? No   Unable to get utilities (heat,electricity)? No         5/6/2024   Dental   Dentist two times every year? (!) NO         5/6/2024   TB Screening   Were you born outside of the [place]? No       Today's PHQ-9 Score:       5/6/2024    10:56 PM   PHQ-9 SCORE   PHQ-9 Total Score MyChart 1 (Minimal depression)   PHQ-9 Total Score 1         5/6/2024   Substance Use   Alcohol more than 3/day or more than 7/wk Not Applicable   Do you use any other substances recreationally? No     Social History     Tobacco Use    Smoking status: Never    Smokeless tobacco: Never   Substance Use Topics    Alcohol use: Yes     Alcohol/week: 1.0 standard drink of alcohol     Types: 1 Standard drinks or equivalent per week    Drug use: Never          Mammogram Screening - Patient under 40 years of age: Routine Mammogram Screening not recommended.           5/6/2024   One time HIV Screening   Previous HIV test? I don't know         5/6/2024   STI Screening   New sexual partner(s) since last STI/HIV test? No     History of abnormal Pap smear: NO - age 30-65 PAP every 5 years with negative HPV co-testing recommended        Latest Ref Rng & Units 1/24/2022     7:26 AM 5/14/2014    12:00 AM   PAP / HPV   PAP  Negative for Intraepithelial Lesion or Malignancy (NILM)     PAP (Historical)   NIL    HPV 16 DNA Negative Negative     HPV 18 DNA Negative Negative     Other HR HPV Negative Negative             5/6/2024   Contraception/Family Planning   Questions about contraception or family planning No        Reviewed and updated as needed this visit by Provider                    History reviewed. No pertinent past medical history.  Past Surgical History:   Procedure Laterality Date    BIOPSY      Mole removed to check for cancer.         Review of Systems  Constitutional, HEENT, cardiovascular, pulmonary, GI, , musculoskeletal, neuro, skin, endocrine and psych  "systems are negative, except as otherwise noted.     Objective    Exam  /78   Pulse 60   Temp 98.8  F (37.1  C) (Tympanic)   Resp 14   Ht 1.753 m (5' 9\")   Wt 117 kg (258 lb)   LMP 04/30/2024 (Approximate)   SpO2 97%   BMI 38.10 kg/m     Estimated body mass index is 38.1 kg/m  as calculated from the following:    Height as of this encounter: 1.753 m (5' 9\").    Weight as of this encounter: 117 kg (258 lb).    Physical Exam  GENERAL: alert and no distress  EYES: Eyes grossly normal to inspection, PERRL and conjunctivae and sclerae normal  HENT: ear canals and TM's normal, nose and mouth without ulcers or lesions  NECK: no adenopathy, no asymmetry, masses, or scars  RESP: lungs clear to auscultation - no rales, rhonchi or wheezes  BREAST: normal without masses, tenderness or nipple discharge and no palpable axillary masses or adenopathy  CV: regular rate and rhythm, normal S1 S2, no S3 or S4, no murmur, click or rub, no peripheral edema  ABDOMEN: soft, nontender, no hepatosplenomegaly, no masses and bowel sounds normal  MS: no gross musculoskeletal defects noted, no edema  SKIN: no suspicious lesions or rashes  NEURO: Normal strength and tone, mentation intact and speech normal  PSYCH: mentation appears normal, affect normal/bright        Signed Electronically by: Verona Ferrari, CNP    "

## 2024-09-04 NOTE — PROGRESS NOTES
SUBJECTIVE:        I spent a total of 25 minutes on the care of Nasrin on the day of service including 20 minutes of face-to-face time with remainder in chart review, care coordination, documentation on the day of service.                                               Nasrin Vasquez is a 39 year old female who presents to clinic today for the following health issue(s):  painful periods.  No chief complaint on file.      Patient is a 39-year-old P0 who presents with complaints of pelvic discomfort, bloating, fatigue.  She notes that this has been going on for approximately 2 weeks.  She typically gets the symptoms at the time of her cycle but seems to be worse at this point.  She has been on Desogen birth control for approximately a year now notes that her cycles have been more regular on that.  Typically her cycles last approximately 5 days states that 2 to 3 days are somewhat heavy.    Socially she works for the [employer] she notes that when she is having a heavy periods or feeling significant cramps that she tends to have to call her boss which gets embarrassing if she needs to go home for period time.  She seems frustrated with the way her cycles are going in general and well it seems that they have improved with birth control pills not to her satisfaction.    She notes that she is on no particular diet and notes that she does not eat necessarily healthy foods.  She tends to spend time playing video games..  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  She has been doing that for approximately 5 years.    Past medical history she does give a history of migraines which seem to be better with the birth control pill.    Past surgical history is negative for abdominal procedures she has only had wisdom teeth removed and a mole removed.    Medications she finds that medications for anxiety does help with her state of mind.    Past GYN history menarche was age 10-12.  Started out  irregularly she notes that they were irregular even in her young adult years.  She notes for several years her periods and heavy bleeding where she be changing a pad every hour to hour and a half.  That seems to have gotten better with birth control pills.  She has had no history of abnormal Pap smears.    Pelvic ultrasound was done most recently in December 2023.  That ultrasound noted a small 2 cm right simple cyst.  The thickness of the endometrium was 13 mm which is within normal limits.  There are no significant masses and the contours of the endometrial cavity were normal and smooth.    Examination:  Deferred due to    We talked about her ultrasound and how it is relatively normal.  She states that she is concerned that she might have endometriosis.  I described that only accurate way to diagnose endometriosis is to do a laparoscopy.  Also suggest that should that cyst on the right side be bigger as opposed to having resolved that would be a reason to get a pelvic ultrasound.  And should her issues be resolved with assistance from primary care regarding her bloating and fatigue that at that point if she still having pelvic pressure or menstrually related irregularities laparoscopic diagnostic procedure would be in order.    I suggested she see her primary care about the bloating and noting that that is the fatigue probably has little to do with the GYN history.  She would also do well to increase her activity and decrease her stress.    Assessment:  Patient is a 39-year-old with some of the symptoms that would suggest dysmenorrhea and potential improvement with medical management.  She is already on birth control.  At this point I expect there is little benefit from GYN hormonal management without dressing her fatigue issues as well as her bloating issues.  I would be interested in potentially doing a diagnostic scope to assess by the question whether she has endometriosis or not     Plan:  Suggest follow-up  with her primary care for management of weight plan,  Should at some point she want to have a diagnostic scope I think that would be a reasonable approach to any other GYN questions that persist diet,Stress fatigue.      No LMP recorded..     Patient is not sexually active, No obstetric history on file..  Using oral contraceptives for contraception.  1year   reports that she has never smoked. She has never used smokeless tobacco.    STD testing offered?  Declined    Health maintenance updated:  no    Today's PHQ-2 Score:       3/20/2023     1:03 PM   PHQ-2 ( 1999 [employer])   PHQ-2 Score Incomplete     Today's PHQ-9 Score:       5/6/2024    10:56 PM   PHQ-9 SCORE   PHQ-9 Total Score MyChart 1 (Minimal depression)   PHQ-9 Total Score 1     Today's WALT-7 Score:       5/6/2024    10:57 PM   WALT-7 SCORE   Total Score 5 (mild anxiety)   Total Score 5       Problem list and histories reviewed & adjusted, as indicated.  Additional history: as documented.    Patient Active Problem List   Diagnosis    Major depressive disorder, recurrent episode, severe with anxious distress (H)     Past Surgical History:   Procedure Laterality Date    BIOPSY      Mole removed to check for cancer.      Social History     Tobacco Use    Smoking status: Never    Smokeless tobacco: Never   Substance Use Topics    Alcohol use: Yes     Alcohol/week: 1.0 standard drink of alcohol     Types: 1 Standard drinks or equivalent per week      Problem (# of Occurrences) Relation (Name,Age of Onset)    Asthma (2) Brother, Brother (Fernando Dillon)    Cancer (2) Maternal Grandmother, Maternal Grandfather              Current Outpatient Medications   Medication Sig Dispense Refill    acetaminophen (TYLENOL) 500 MG tablet Take 1-2 tablets (500-1,000 mg) by mouth every 6 hours as needed for mild pain 30 tablet 0    desogestrel-ethinyl estradiol (APRI) 0.15-30 MG-MCG tablet Take 1 tablet by mouth daily 84 tablet 3    ibuprofen (ADVIL/MOTRIN) 600 MG tablet Take 1  tablet (600 mg) by mouth every 6 hours as needed 30 tablet 0    ondansetron (ZOFRAN ODT) 4 MG ODT tab Take 1 tablet (4 mg) by mouth every 8 hours as needed for nausea 30 tablet 0    propranolol (INDERAL) 10 MG tablet Take 1-2 tabs, 3 times per day as needed for anxiety and panic. 30 tablet 0    SUMAtriptan (IMITREX) 25 MG tablet Take 1 tablet (25 mg) by mouth at onset of headache for migraine May repeat in 2 hours. Max 8 tablets/24 hours. 18 tablet 0     Current Facility-Administered Medications   Medication Dose Route Frequency Provider Last Rate Last Admin    hydrogen peroxide 3 % solution 1 mL  1 mL Topical See Admin Instructions          Allergies   Allergen Reactions    No Known Drug Allergy          OBJECTIVE:     There were no vitals taken for this visit.  There is no height or weight on file to calculate BMI.    Exam:       In-Clinic Test Results:  No results found for this or any previous visit (from the past 24 hour(s)).    ASSESSMENT/PLAN:                                                    See above    Rui Jefferson MD  [facility]

## 2024-09-05 ENCOUNTER — OFFICE VISIT (OUTPATIENT)
Dept: OBGYN | Facility: CLINIC | Age: 39
End: 2024-09-05
Payer: COMMERCIAL

## 2024-09-05 VITALS — DIASTOLIC BLOOD PRESSURE: 70 MMHG | SYSTOLIC BLOOD PRESSURE: 118 MMHG | BODY MASS INDEX: 38.99 KG/M2 | WEIGHT: 264 LBS

## 2024-09-05 DIAGNOSIS — N83.201 CYST OF RIGHT OVARY: Primary | ICD-10-CM

## 2024-09-05 PROCEDURE — 99203 OFFICE O/P NEW LOW 30 MIN: CPT | Performed by: OBSTETRICS & GYNECOLOGY

## 2024-09-05 NOTE — NURSING NOTE
"Chief Complaint   Patient presents with    Abdominal Pain       Initial /70   Wt 119.7 kg (264 lb)   LMP 08/21/2024 (Exact Date)   BMI 38.99 kg/m   Estimated body mass index is 38.99 kg/m  as calculated from the following:    Height as of 5/7/24: 1.753 m (5' 9\").    Weight as of this encounter: 119.7 kg (264 lb).  BP completed using cuff size: regular    Questioned patient about current smoking habits.  Pt. has never smoked.      No obstetric history on file.    The following HM Due: NONE      Donya Anguiano LPN on 9/5/2024 at 1:37 PM             "

## 2024-09-06 ENCOUNTER — ANCILLARY PROCEDURE (OUTPATIENT)
Dept: ULTRASOUND IMAGING | Facility: CLINIC | Age: 39
End: 2024-09-06
Attending: OBSTETRICS & GYNECOLOGY
Payer: COMMERCIAL

## 2024-09-06 DIAGNOSIS — N83.201 CYST OF RIGHT OVARY: ICD-10-CM

## 2024-09-06 PROCEDURE — 76830 TRANSVAGINAL US NON-OB: CPT | Performed by: OBSTETRICS & GYNECOLOGY

## 2024-09-06 PROCEDURE — 76856 US EXAM PELVIC COMPLETE: CPT | Performed by: OBSTETRICS & GYNECOLOGY

## 2025-03-03 ENCOUNTER — OFFICE VISIT (OUTPATIENT)
Dept: URGENT CARE | Facility: URGENT CARE | Age: 40
End: 2025-03-03
Payer: COMMERCIAL

## 2025-03-03 VITALS
DIASTOLIC BLOOD PRESSURE: 83 MMHG | TEMPERATURE: 98.6 F | WEIGHT: 267 LBS | HEART RATE: 71 BPM | OXYGEN SATURATION: 98 % | RESPIRATION RATE: 19 BRPM | SYSTOLIC BLOOD PRESSURE: 137 MMHG | BODY MASS INDEX: 39.43 KG/M2

## 2025-03-03 DIAGNOSIS — J01.90 ACUTE SINUSITIS WITH COEXISTING CONDITION REQUIRING PROPHYLACTIC TREATMENT: Primary | ICD-10-CM

## 2025-03-03 PROCEDURE — 99213 OFFICE O/P EST LOW 20 MIN: CPT | Performed by: FAMILY MEDICINE

## 2025-03-03 PROCEDURE — 3075F SYST BP GE 130 - 139MM HG: CPT | Performed by: FAMILY MEDICINE

## 2025-03-03 PROCEDURE — 3079F DIAST BP 80-89 MM HG: CPT | Performed by: FAMILY MEDICINE

## 2025-03-03 NOTE — PROGRESS NOTES
SUBJECTIVE: Nasrin Vasquez is a 39 year old female here with concerns about sinus infection.  She states onset  of symptoms were greater than 10 days with sinus pressure and drainage.  Course of illness is worsening.    Severity: moderate  Current and Associated symptoms: cold symptoms  Predisposing factors include none.   Recent treatment has included: OTC's    No past medical history on file.  Allergies   Allergen Reactions    No Known Drug Allergy      Social History     Tobacco Use    Smoking status: Never    Smokeless tobacco: Never   Substance Use Topics    Alcohol use: Yes     Alcohol/week: 1.0 standard drink of alcohol     Types: 1 Standard drinks or equivalent per week       ROS:   no rash  no vomiting    OBJECTIVE:  /83 (BP Location: Left arm, Patient Position: Sitting, Cuff Size: Adult Large)   Pulse 71   Temp 98.6  F (37  C) (Oral)   Resp 19   Wt 121.1 kg (267 lb)   SpO2 98%   BMI 39.43 kg/m    NAD  EYES: clear, no mattering  EARS: TM's clear, no redness/buldging, canals clear, no swelling/redness  NOSE: discolored discharge mary. Nares  THROAT: clear, no erythema, no exudate  SINUS: maxillary tenderness with palpation  LUNGS: CTAB, no rhonchi/wheeze/rales      ICD-10-CM    1. Acute sinusitis with coexisting condition requiring prophylactic treatment  J01.90 amoxicillin-clavulanate (AUGMENTIN) 875-125 MG tablet          Follow up with primary clinic if not improving

## 2025-03-13 ENCOUNTER — TELEPHONE (OUTPATIENT)
Dept: FAMILY MEDICINE | Facility: CLINIC | Age: 40
End: 2025-03-13

## 2025-03-13 NOTE — TELEPHONE ENCOUNTER
General Call    Contacts       Contact Date/Time Type Contact Phone/Fax    03/13/2025 05:25 PM CDT Phone (Outgoing) PedroJasmine (Self) [phone] (H)    Left Message           Reason for Call:  to early for physical     What are your questions or concerns:  3/24/25 physical, last year physical 5/7/24. Please have pt check with insurance to inquire coverage for physical: per calendar yr or 366 days from last one  Or pt can change to OV    Date of last appointment with provider: 5/7/24    Could we send this information to you [facility] or would you prefer to receive a phone call?:   No preference   Okay to leave a detailed message?: N/A at Other phone number:

## 2025-03-20 SDOH — HEALTH STABILITY: PHYSICAL HEALTH: ON AVERAGE, HOW MANY DAYS PER WEEK DO YOU ENGAGE IN MODERATE TO STRENUOUS EXERCISE (LIKE A BRISK WALK)?: 6 DAYS

## 2025-03-20 SDOH — HEALTH STABILITY: PHYSICAL HEALTH: ON AVERAGE, HOW MANY MINUTES DO YOU ENGAGE IN EXERCISE AT THIS LEVEL?: 120 MIN

## 2025-03-20 ASSESSMENT — SOCIAL DETERMINANTS OF HEALTH (SDOH): HOW OFTEN DO YOU GET TOGETHER WITH FRIENDS OR RELATIVES?: ONCE A WEEK

## 2025-03-23 ASSESSMENT — PATIENT HEALTH QUESTIONNAIRE - PHQ9
SUM OF ALL RESPONSES TO PHQ QUESTIONS 1-9: 3
10. IF YOU CHECKED OFF ANY PROBLEMS, HOW DIFFICULT HAVE THESE PROBLEMS MADE IT FOR YOU TO DO YOUR WORK, TAKE CARE OF THINGS AT HOME, OR GET ALONG WITH OTHER PEOPLE: SOMEWHAT DIFFICULT
SUM OF ALL RESPONSES TO PHQ QUESTIONS 1-9: 3

## 2025-03-24 ENCOUNTER — OFFICE VISIT (OUTPATIENT)
Dept: FAMILY MEDICINE | Facility: CLINIC | Age: 40
End: 2025-03-24
Payer: COMMERCIAL

## 2025-03-24 VITALS
SYSTOLIC BLOOD PRESSURE: 108 MMHG | RESPIRATION RATE: 18 BRPM | TEMPERATURE: 98.3 F | DIASTOLIC BLOOD PRESSURE: 86 MMHG | HEIGHT: 70 IN | OXYGEN SATURATION: 97 % | HEART RATE: 70 BPM | BODY MASS INDEX: 37.97 KG/M2 | WEIGHT: 265.2 LBS

## 2025-03-24 DIAGNOSIS — Z13.1 SCREENING FOR DIABETES MELLITUS: ICD-10-CM

## 2025-03-24 DIAGNOSIS — G43.909 MIGRAINE WITHOUT STATUS MIGRAINOSUS, NOT INTRACTABLE, UNSPECIFIED MIGRAINE TYPE: ICD-10-CM

## 2025-03-24 DIAGNOSIS — Z00.00 ROUTINE PHYSICAL EXAMINATION: Primary | ICD-10-CM

## 2025-03-24 DIAGNOSIS — Z13.220 SCREENING CHOLESTEROL LEVEL: ICD-10-CM

## 2025-03-24 DIAGNOSIS — N94.6 PAINFUL MENSTRUAL PERIODS: ICD-10-CM

## 2025-03-24 LAB
ALBUMIN SERPL BCG-MCNC: 3.4 G/DL (ref 3.5–5.2)
ALP SERPL-CCNC: 97 U/L (ref 40–150)
ALT SERPL W P-5'-P-CCNC: 16 U/L (ref 0–50)
ANION GAP SERPL CALCULATED.3IONS-SCNC: 12 MMOL/L (ref 7–15)
AST SERPL W P-5'-P-CCNC: 17 U/L (ref 0–45)
BILIRUB SERPL-MCNC: 0.3 MG/DL
BUN SERPL-MCNC: 11.6 MG/DL (ref 6–20)
CALCIUM SERPL-MCNC: 9 MG/DL (ref 8.8–10.4)
CHLORIDE SERPL-SCNC: 107 MMOL/L (ref 98–107)
CHOLEST SERPL-MCNC: 202 MG/DL
CREAT SERPL-MCNC: 0.9 MG/DL (ref 0.51–0.95)
EGFRCR SERPLBLD CKD-EPI 2021: 84 ML/MIN/1.73M2
FASTING STATUS PATIENT QL REPORTED: YES
FASTING STATUS PATIENT QL REPORTED: YES
GLUCOSE SERPL-MCNC: 104 MG/DL (ref 70–99)
HCO3 SERPL-SCNC: 22 MMOL/L (ref 22–29)
HDLC SERPL-MCNC: 72 MG/DL
LDLC SERPL CALC-MCNC: 115 MG/DL
NONHDLC SERPL-MCNC: 130 MG/DL
POTASSIUM SERPL-SCNC: 4.1 MMOL/L (ref 3.4–5.3)
PROT SERPL-MCNC: 6.8 G/DL (ref 6.4–8.3)
SODIUM SERPL-SCNC: 141 MMOL/L (ref 135–145)
TRIGL SERPL-MCNC: 75 MG/DL

## 2025-03-24 PROCEDURE — 99395 PREV VISIT EST AGE 18-39: CPT | Performed by: NURSE PRACTITIONER

## 2025-03-24 PROCEDURE — 99213 OFFICE O/P EST LOW 20 MIN: CPT | Mod: 25 | Performed by: NURSE PRACTITIONER

## 2025-03-24 PROCEDURE — 36415 COLL VENOUS BLD VENIPUNCTURE: CPT | Performed by: NURSE PRACTITIONER

## 2025-03-24 PROCEDURE — 80053 COMPREHEN METABOLIC PANEL: CPT | Performed by: NURSE PRACTITIONER

## 2025-03-24 PROCEDURE — 80061 LIPID PANEL: CPT | Performed by: NURSE PRACTITIONER

## 2025-03-24 PROCEDURE — 3079F DIAST BP 80-89 MM HG: CPT | Performed by: NURSE PRACTITIONER

## 2025-03-24 PROCEDURE — 3074F SYST BP LT 130 MM HG: CPT | Performed by: NURSE PRACTITIONER

## 2025-03-24 RX ORDER — ONDANSETRON 4 MG/1
4 TABLET, ORALLY DISINTEGRATING ORAL EVERY 8 HOURS PRN
Qty: 30 TABLET | Refills: 2 | Status: SHIPPED | OUTPATIENT
Start: 2025-03-24 | End: 2025-03-24

## 2025-03-24 RX ORDER — SUMATRIPTAN SUCCINATE 25 MG/1
25 TABLET ORAL
Qty: 18 TABLET | Refills: 11 | Status: SHIPPED | OUTPATIENT
Start: 2025-03-24 | End: 2025-03-24

## 2025-03-24 RX ORDER — DESOGESTREL AND ETHINYL ESTRADIOL 0.15-0.03
1 KIT ORAL DAILY
Qty: 84 TABLET | Refills: 3 | Status: SHIPPED | OUTPATIENT
Start: 2025-03-24 | End: 2025-03-24

## 2025-03-24 RX ORDER — DESOGESTREL AND ETHINYL ESTRADIOL 0.15-0.03
1 KIT ORAL DAILY
Qty: 84 TABLET | Refills: 3 | Status: SHIPPED | OUTPATIENT
Start: 2025-03-24

## 2025-03-24 RX ORDER — ONDANSETRON 4 MG/1
4 TABLET, ORALLY DISINTEGRATING ORAL EVERY 8 HOURS PRN
Qty: 30 TABLET | Refills: 2 | Status: SHIPPED | OUTPATIENT
Start: 2025-03-24

## 2025-03-24 RX ORDER — SUMATRIPTAN SUCCINATE 25 MG/1
25 TABLET ORAL
Qty: 18 TABLET | Refills: 11 | Status: SHIPPED | OUTPATIENT
Start: 2025-03-24

## 2025-03-24 ASSESSMENT — ANXIETY QUESTIONNAIRES
1. FEELING NERVOUS, ANXIOUS, OR ON EDGE: SEVERAL DAYS
6. BECOMING EASILY ANNOYED OR IRRITABLE: SEVERAL DAYS
7. FEELING AFRAID AS IF SOMETHING AWFUL MIGHT HAPPEN: SEVERAL DAYS
GAD7 TOTAL SCORE: 5
GAD7 TOTAL SCORE: 5
7. FEELING AFRAID AS IF SOMETHING AWFUL MIGHT HAPPEN: SEVERAL DAYS
8. IF YOU CHECKED OFF ANY PROBLEMS, HOW DIFFICULT HAVE THESE MADE IT FOR YOU TO DO YOUR WORK, TAKE CARE OF THINGS AT HOME, OR GET ALONG WITH OTHER PEOPLE?: NOT DIFFICULT AT ALL
IF YOU CHECKED OFF ANY PROBLEMS ON THIS QUESTIONNAIRE, HOW DIFFICULT HAVE THESE PROBLEMS MADE IT FOR YOU TO DO YOUR WORK, TAKE CARE OF THINGS AT HOME, OR GET ALONG WITH OTHER PEOPLE: NOT DIFFICULT AT ALL
2. NOT BEING ABLE TO STOP OR CONTROL WORRYING: SEVERAL DAYS
4. TROUBLE RELAXING: NOT AT ALL
5. BEING SO RESTLESS THAT IT IS HARD TO SIT STILL: NOT AT ALL
GAD7 TOTAL SCORE: 5
3. WORRYING TOO MUCH ABOUT DIFFERENT THINGS: SEVERAL DAYS

## 2025-03-24 NOTE — PROGRESS NOTES
"Preventive Care Visit  [facility]  Verona Ferrari CNP, Nurse Practitioner - Family  Mar 24, 2025      Assessment & Plan     Routine physical examination    Painful menstrual periods  - desogestrel-ethinyl estradiol (ISIBLOOM) 0.15-30 MG-MCG tablet  Dispense: 84 tablet; Refill: 3    Migraine without status migrainosus, not intractable, unspecified migraine type  - ondansetron (ZOFRAN ODT) 4 MG ODT tab  Dispense: 30 tablet; Refill: 2  - SUMAtriptan (IMITREX) 25 MG tablet  Dispense: 18 tablet; Refill: 11    Screening cholesterol level  - Lipid panel reflex to direct LDL Fasting  - Lipid panel reflex to direct LDL Fasting    Screening for diabetes mellitus  - Comprehensive metabolic panel (BMP + Alb, Alk Phos, ALT, AST, Total. Bili, TP)  - Comprehensive metabolic panel (BMP + Alb, Alk Phos, ALT, AST, Total. Bili, TP)      Patient has been advised of split billing requirements and indicates understanding: Yes        BMI  Estimated body mass index is 38.05 kg/m  as calculated from the following:    Height as of this encounter: 1.778 m (5' 10\").    Weight as of this encounter: 120.3 kg (265 lb 3.2 oz).   Weight management plan: Discussed healthy diet and exercise guidelines    Counseling  Appropriate preventive services were addressed with this patient via screening, questionnaire, or discussion as appropriate for fall prevention, nutrition, physical activity, Tobacco-use cessation, social engagement, weight loss and cognition.  Checklist reviewing preventive services available has been given to the patient.  Reviewed patient's diet, addressing concerns and/or questions.   The patient was instructed to see the dentist every 6 months.       See Patient Instructions    Roge Ferraro is a 39 year old, presenting for the following:  Physical        3/24/2025     6:53 AM   Additional Questions   Roomed by Ann Marie ROSSI   Accompanied by Self          HPI    Depression and Anxiety   How are you doing with " your depression since your last visit? Improved   How are you doing with your anxiety since your last visit?  Worsened   Are you having other symptoms that might be associated with depression or anxiety? No  Have you had a significant life event? Health Concerns Mom was in hospital   Do you have any concerns with your use of alcohol or other drugs? No    Social History     Tobacco Use    Smoking status: Never    Smokeless tobacco: Never   Substance Use Topics    Alcohol use: Yes     Alcohol/week: 1.0 standard drink of alcohol     Types: 1 Standard drinks or equivalent per week    Drug use: Never         9/18/2023     6:58 AM 5/6/2024    10:56 PM 3/23/2025    10:27 PM   PHQ   PHQ-9 Total Score 2 1 3    Q9: Thoughts of better off dead/self-harm past 2 weeks Not at all Not at all Not at all       Patient-reported         3/20/2023     1:03 PM 5/6/2024    10:57 PM 3/24/2025     6:51 AM   WALT-7 SCORE   Total Score 13 (moderate anxiety) 5 (mild anxiety) 5 (mild anxiety)   Total Score 13 5 5        Patient-reported         3/23/2025    10:27 PM   Last PHQ-9   1.  Little interest or pleasure in doing things 0   2.  Feeling down, depressed, or hopeless 1   3.  Trouble falling or staying asleep, or sleeping too much 0   4.  Feeling tired or having little energy 1   5.  Poor appetite or overeating 1   6.  Feeling bad about yourself 0   7.  Trouble concentrating 0   8.  Moving slowly or restless 0   Q9: Thoughts of better off dead/self-harm past 2 weeks 0   PHQ-9 Total Score 3        Patient-reported         3/24/2025     6:51 AM   WALT-7    1. Feeling nervous, anxious, or on edge 1   2. Not being able to stop or control worrying 1   3. Worrying too much about different things 1   4. Trouble relaxing 0   5. Being so restless that it is hard to sit still 0   6. Becoming easily annoyed or irritable 1   7. Feeling afraid, as if something awful might happen 1   WALT-7 Total Score 5    If you checked any problems, how difficult have  they made it for you to do your work, take care of things at home, or get along with other people? Not difficult at all       Patient-reported       Suicide Assessment Five-step Evaluation and Treatment (SAFE-T)    Migraine   Since your last clinic visit, how have your headaches changed?  Improved  How often are you getting headaches or migraines? Every couple months    Are you able to do normal daily activities when you have a migraine? No  Are you taking rescue/relief medications? (Select all that apply) sumatriptan (Imitrex)  How helpful is your rescue/relief medication?  I get total relief  Are you taking any medications to prevent migraines? (Select all that apply)  No  In the past 4 weeks, how often have you gone to urgent care or the emergency room because of your headaches?  0    Advance Care Planning  Patient does not have a Health Care Directive: Discussed advance care planning with patient; however, patient declined at this time.      3/20/2025   General Health   How would you rate your overall physical health? (!) POOR   Feel stress (tense, anxious, or unable to sleep) Only a little   (!) STRESS CONCERN      3/20/2025   Nutrition   Three or more servings of calcium each day? (!) NO   Diet: Regular (no restrictions)   How many servings of fruit and vegetables per day? (!) 0-1   How many sweetened beverages each day? 0-1         3/20/2025   Exercise   Days per week of moderate/strenous exercise 6 days   Average minutes spent exercising at this level 120 min         3/20/2025   Social Factors   Frequency of gathering with friends or relatives Once a week   Worry food won't last until get money to buy more No   Food not last or not have enough money for food? No   Do you have housing? (Housing is defined as stable permanent housing and does not include staying ouside in a car, in a tent, in an abandoned building, in an overnight shelter, or couch-surfing.) Yes   Are you worried about losing your housing? No    Lack of transportation? No   Unable to get utilities (heat,electricity)? No         3/20/2025   Dental   Dentist two times every year? (!) NO           5/6/2024   TB Screening   Were you born outside of the [place]? No         Today's PHQ-9 Score:       3/23/2025    10:27 PM   PHQ-9 SCORE   PHQ-9 Total Score MyChart 3 (Minimal depression)   PHQ-9 Total Score 3        Patient-reported         3/20/2025   Substance Use   Alcohol more than 3/day or more than 7/wk Not Applicable   Do you use any other substances recreationally? No     Social History     Tobacco Use    Smoking status: Never    Smokeless tobacco: Never   Substance Use Topics    Alcohol use: Yes     Alcohol/week: 1.0 standard drink of alcohol     Types: 1 Standard drinks or equivalent per week    Drug use: Never          Mammogram Screening - Patient under 40 years of age: Routine Mammogram Screening not recommended.         3/20/2025   STI Screening   New sexual partner(s) since last STI/HIV test? No     History of abnormal Pap smear: No - age 30-64 HPV with reflex Pap every 5 years recommended        Latest Ref Rng & Units 1/24/2022     7:26 AM 5/14/2014    12:00 AM   PAP / HPV   PAP  Negative for Intraepithelial Lesion or Malignancy (NILM)     PAP (Historical)   NIL    HPV 16 DNA Negative Negative     HPV 18 DNA Negative Negative     Other HR HPV Negative Negative             3/20/2025   Contraception/Family Planning   Questions about contraception or family planning No        Reviewed and updated as needed this visit by Provider                    History reviewed. No pertinent past medical history.  Past Surgical History:   Procedure Laterality Date    BIOPSY      Mole removed to check for cancer.         Review of Systems  Constitutional, HEENT, cardiovascular, pulmonary, GI, , musculoskeletal, neuro, skin, endocrine and psych systems are negative, except as otherwise noted.     Objective    Exam  There were no vitals taken for this visit.   Estimated  "body mass index is 39.43 kg/m  as calculated from the following:    Height as of 5/7/24: 1.753 m (5' 9\").    Weight as of 3/3/25: 121.1 kg (267 lb).    Physical Exam  GENERAL: alert and no distress  EYES: Eyes grossly normal to inspection, PERRL and conjunctivae and sclerae normal  HENT: ear canals and TM's normal, nose and mouth without ulcers or lesions  NECK: no adenopathy, no asymmetry, masses, or scars  RESP: lungs clear to auscultation - no rales, rhonchi or wheezes  BREAST: normal without masses, tenderness or nipple discharge and no palpable axillary masses or adenopathy  CV: regular rate and rhythm, normal S1 S2, no S3 or S4, no murmur, click or rub, no peripheral edema  ABDOMEN: soft, nontender, no hepatosplenomegaly, no masses and bowel sounds normal  MS: no gross musculoskeletal defects noted, no edema  SKIN: no suspicious lesions or rashes  NEURO: Normal strength and tone, mentation intact and speech normal  PSYCH: mentation appears normal, affect normal/bright        Signed Electronically by: Verona Ferrari CNP    Answers submitted by the patient for this visit:  Patient Health Questionnaire (Submitted on 3/23/2025)  If you checked off any problems, how difficult have these problems made it for you to do your work, take care of things at home, or get along with other people?: Somewhat difficult  PHQ9 TOTAL SCORE: 3  Patient Health Questionnaire (G7) (Submitted on 3/24/2025)  WALT 7 TOTAL SCORE: 5    "

## 2025-08-27 ENCOUNTER — OFFICE VISIT (OUTPATIENT)
Dept: URGENT CARE | Facility: URGENT CARE | Age: 40
End: 2025-08-27

## 2025-08-27 VITALS
DIASTOLIC BLOOD PRESSURE: 81 MMHG | SYSTOLIC BLOOD PRESSURE: 122 MMHG | RESPIRATION RATE: 18 BRPM | HEART RATE: 60 BPM | OXYGEN SATURATION: 97 % | TEMPERATURE: 99.2 F